# Patient Record
Sex: MALE | Race: ASIAN | NOT HISPANIC OR LATINO | URBAN - METROPOLITAN AREA
[De-identification: names, ages, dates, MRNs, and addresses within clinical notes are randomized per-mention and may not be internally consistent; named-entity substitution may affect disease eponyms.]

---

## 2019-09-08 ENCOUNTER — INPATIENT (INPATIENT)
Facility: HOSPITAL | Age: 59
LOS: 3 days | Discharge: HOME | DRG: 551 | End: 2019-09-12
Attending: NEUROLOGICAL SURGERY | Admitting: NEUROLOGICAL SURGERY
Payer: COMMERCIAL

## 2019-09-08 VITALS
HEART RATE: 81 BPM | OXYGEN SATURATION: 98 % | RESPIRATION RATE: 18 BRPM | SYSTOLIC BLOOD PRESSURE: 147 MMHG | DIASTOLIC BLOOD PRESSURE: 91 MMHG | HEIGHT: 71 IN | TEMPERATURE: 98 F | WEIGHT: 179.9 LBS

## 2019-09-08 LAB
ALBUMIN SERPL ELPH-MCNC: 4.7 G/DL — SIGNIFICANT CHANGE UP (ref 3.3–5)
ALP SERPL-CCNC: 112 U/L — SIGNIFICANT CHANGE UP (ref 40–120)
ALT FLD-CCNC: 22 U/L — SIGNIFICANT CHANGE UP (ref 10–45)
ANION GAP SERPL CALC-SCNC: 12 MMOL/L — SIGNIFICANT CHANGE UP (ref 5–17)
APPEARANCE UR: CLEAR — SIGNIFICANT CHANGE UP
APTT BLD: 32.5 SEC — SIGNIFICANT CHANGE UP (ref 27.5–36.3)
AST SERPL-CCNC: 31 U/L — SIGNIFICANT CHANGE UP (ref 10–40)
BACTERIA # UR AUTO: NEGATIVE — SIGNIFICANT CHANGE UP
BASOPHILS # BLD AUTO: 0.1 K/UL — SIGNIFICANT CHANGE UP (ref 0–0.2)
BASOPHILS NFR BLD AUTO: 0.9 % — SIGNIFICANT CHANGE UP (ref 0–2)
BILIRUB SERPL-MCNC: 0.4 MG/DL — SIGNIFICANT CHANGE UP (ref 0.2–1.2)
BILIRUB UR-MCNC: NEGATIVE — SIGNIFICANT CHANGE UP
BUN SERPL-MCNC: 13 MG/DL — SIGNIFICANT CHANGE UP (ref 7–23)
CALCIUM SERPL-MCNC: 10.1 MG/DL — SIGNIFICANT CHANGE UP (ref 8.4–10.5)
CHLORIDE SERPL-SCNC: 103 MMOL/L — SIGNIFICANT CHANGE UP (ref 96–108)
CO2 SERPL-SCNC: 25 MMOL/L — SIGNIFICANT CHANGE UP (ref 22–31)
COLOR SPEC: COLORLESS — SIGNIFICANT CHANGE UP
CREAT SERPL-MCNC: 1.17 MG/DL — SIGNIFICANT CHANGE UP (ref 0.5–1.3)
DIFF PNL FLD: ABNORMAL
EOSINOPHIL # BLD AUTO: 0.2 K/UL — SIGNIFICANT CHANGE UP (ref 0–0.5)
EOSINOPHIL NFR BLD AUTO: 2.9 % — SIGNIFICANT CHANGE UP (ref 0–6)
EPI CELLS # UR: 0 /HPF — SIGNIFICANT CHANGE UP
GLUCOSE SERPL-MCNC: 108 MG/DL — HIGH (ref 70–99)
GLUCOSE UR QL: NEGATIVE — SIGNIFICANT CHANGE UP
HCT VFR BLD CALC: 44.2 % — SIGNIFICANT CHANGE UP (ref 39–50)
HGB BLD-MCNC: 15.1 G/DL — SIGNIFICANT CHANGE UP (ref 13–17)
HYALINE CASTS # UR AUTO: 1 /LPF — SIGNIFICANT CHANGE UP (ref 0–2)
INR BLD: 1.02 RATIO — SIGNIFICANT CHANGE UP (ref 0.88–1.16)
KETONES UR-MCNC: NEGATIVE — SIGNIFICANT CHANGE UP
LEUKOCYTE ESTERASE UR-ACNC: NEGATIVE — SIGNIFICANT CHANGE UP
LYMPHOCYTES # BLD AUTO: 1.9 K/UL — SIGNIFICANT CHANGE UP (ref 1–3.3)
LYMPHOCYTES # BLD AUTO: 24.9 % — SIGNIFICANT CHANGE UP (ref 13–44)
MCHC RBC-ENTMCNC: 28.5 PG — SIGNIFICANT CHANGE UP (ref 27–34)
MCHC RBC-ENTMCNC: 34.2 GM/DL — SIGNIFICANT CHANGE UP (ref 32–36)
MCV RBC AUTO: 83.2 FL — SIGNIFICANT CHANGE UP (ref 80–100)
MONOCYTES # BLD AUTO: 0.6 K/UL — SIGNIFICANT CHANGE UP (ref 0–0.9)
MONOCYTES NFR BLD AUTO: 7.8 % — SIGNIFICANT CHANGE UP (ref 2–14)
NEUTROPHILS # BLD AUTO: 4.8 K/UL — SIGNIFICANT CHANGE UP (ref 1.8–7.4)
NEUTROPHILS NFR BLD AUTO: 63.5 % — SIGNIFICANT CHANGE UP (ref 43–77)
NITRITE UR-MCNC: NEGATIVE — SIGNIFICANT CHANGE UP
PH UR: 6.5 — SIGNIFICANT CHANGE UP (ref 5–8)
PLATELET # BLD AUTO: 160 K/UL — SIGNIFICANT CHANGE UP (ref 150–400)
POTASSIUM SERPL-MCNC: 4.1 MMOL/L — SIGNIFICANT CHANGE UP (ref 3.5–5.3)
POTASSIUM SERPL-SCNC: 4.1 MMOL/L — SIGNIFICANT CHANGE UP (ref 3.5–5.3)
PROT SERPL-MCNC: 7.6 G/DL — SIGNIFICANT CHANGE UP (ref 6–8.3)
PROT UR-MCNC: NEGATIVE — SIGNIFICANT CHANGE UP
PROTHROM AB SERPL-ACNC: 11.7 SEC — SIGNIFICANT CHANGE UP (ref 10–12.9)
RBC # BLD: 5.31 M/UL — SIGNIFICANT CHANGE UP (ref 4.2–5.8)
RBC # FLD: 13.7 % — SIGNIFICANT CHANGE UP (ref 10.3–14.5)
RBC CASTS # UR COMP ASSIST: 14 /HPF — HIGH (ref 0–4)
SODIUM SERPL-SCNC: 140 MMOL/L — SIGNIFICANT CHANGE UP (ref 135–145)
SP GR SPEC: 1.01 — SIGNIFICANT CHANGE UP (ref 1.01–1.02)
UROBILINOGEN FLD QL: NEGATIVE — SIGNIFICANT CHANGE UP
WBC # BLD: 7.6 K/UL — SIGNIFICANT CHANGE UP (ref 3.8–10.5)
WBC # FLD AUTO: 7.6 K/UL — SIGNIFICANT CHANGE UP (ref 3.8–10.5)
WBC UR QL: 4 /HPF — SIGNIFICANT CHANGE UP (ref 0–5)

## 2019-09-08 PROCEDURE — 72125 CT NECK SPINE W/O DYE: CPT | Mod: 26

## 2019-09-08 PROCEDURE — 99285 EMERGENCY DEPT VISIT HI MDM: CPT

## 2019-09-08 PROCEDURE — 72148 MRI LUMBAR SPINE W/O DYE: CPT | Mod: 26

## 2019-09-08 PROCEDURE — 72141 MRI NECK SPINE W/O DYE: CPT | Mod: 26

## 2019-09-08 PROCEDURE — 72146 MRI CHEST SPINE W/O DYE: CPT | Mod: 26

## 2019-09-08 PROCEDURE — 72128 CT CHEST SPINE W/O DYE: CPT | Mod: 26

## 2019-09-08 RX ORDER — DIAZEPAM 5 MG
5 TABLET ORAL ONCE
Refills: 0 | Status: DISCONTINUED | OUTPATIENT
Start: 2019-09-08 | End: 2019-09-08

## 2019-09-08 RX ORDER — MORPHINE SULFATE 50 MG/1
4 CAPSULE, EXTENDED RELEASE ORAL ONCE
Refills: 0 | Status: DISCONTINUED | OUTPATIENT
Start: 2019-09-08 | End: 2019-09-08

## 2019-09-08 RX ORDER — ACETAMINOPHEN 500 MG
975 TABLET ORAL ONCE
Refills: 0 | Status: COMPLETED | OUTPATIENT
Start: 2019-09-08 | End: 2019-09-08

## 2019-09-08 RX ADMIN — Medication 975 MILLIGRAM(S): at 20:58

## 2019-09-08 RX ADMIN — MORPHINE SULFATE 4 MILLIGRAM(S): 50 CAPSULE, EXTENDED RELEASE ORAL at 20:06

## 2019-09-08 RX ADMIN — Medication 975 MILLIGRAM(S): at 20:06

## 2019-09-08 RX ADMIN — MORPHINE SULFATE 4 MILLIGRAM(S): 50 CAPSULE, EXTENDED RELEASE ORAL at 20:58

## 2019-09-08 RX ADMIN — Medication 5 MILLIGRAM(S): at 20:06

## 2019-09-08 NOTE — ED PROVIDER NOTE - CLINICAL SUMMARY MEDICAL DECISION MAKING FREE TEXT BOX
60yo male pt with PMHx, of HTN, HLD, ambulatory c/o neck/ upper back pain and B/L arm burning tingling pain/ weakness s/p fall one hour ago. Pt stated he slipped and fell hitting upper back on the bathtub. Denies LOC or head injury. Denies headache, dizziness or visual changes. Denies N/V. Denies CP/SOB/ABD pain. Denies pelvic or hip pain. Denies fever, chills or recent sickness. NAD, VSS, Afebrile, + PERRL with full EOMs, No facial or scalp tender. C6,7 and T1,2,3 tender without lesions. General B/L UE tender to palpation with intact sensory. Diminished grab strength of b/l hand secondary to pain. Warm and Dry skin with cap refill <2sec. Lungs clear. ABD soft, non tender. No RIB or CVA tender. No pelvic or hip tender. Normal rectal tone. Normal B/L LE exam. cocern for central cord syndrome v/s vertebral fracture cord compression CT scan cx and thoracic spines possible MRI Neurosurgery spine eval --Quiroga

## 2019-09-08 NOTE — ED ADULT TRIAGE NOTE - CHIEF COMPLAINT QUOTE
slipped and fell in bathroom on to back; denies headstrike; ambulatory; numbness/tingling bilateral arm; pain upper back; denies anticoagulation

## 2019-09-08 NOTE — ED PROVIDER NOTE - PROGRESS NOTE DETAILS
Meseret BECKWITH C- collar applied. pt's evaluated by neurosurgery and recommended MRI. Pt will transfer to CDU for frequent neuro check and MRI. pt's evaluated by neurosurgery and recommended MRI. Pt went to MRI.

## 2019-09-08 NOTE — CONSULT NOTE ADULT - ASSESSMENT
Mayte Rosado   59 M s/p fall and hit his neck, now with b/l UE tingling and numbness. CT C and T spine negative for any acute injuries. On exam: UE proximal 4+/5, HG 4-/5 L weaker than right, tingling numbness down both arms. Otherwise neurologically intact  - MRI C and T spine to rule out any cord injury or compression

## 2019-09-08 NOTE — ED PROVIDER NOTE - ATTENDING CONTRIBUTION TO CARE
I have seen and evaluated this patient with the [facility] practice clinician.   I agree with the findings  unless other wise stated. I have amended notes where needed.  After my face to face bedside evaluation, I am notinyo male pt with PMHx, of HTN, HLD, ambulatory c/o neck/ upper back pain and B/L arm burning tingling pain/ weakness s/p fall one hour ago. Pt stated he slipped and fell hitting upper back on the bathtub. Denies LOC or head injury. Denies headache, dizziness or visual changes. Denies N/V. Denies CP/SOB/ABD pain. Denies pelvic or hip pain. Denies fever, chills or recent sickness. NAD, VSS, Afebrile, + PERRL with full EOMs, No facial or scalp tender. C6,7 and T1,2,3 tender without lesions. General B/L UE tender to palpation with intact sensory. Diminished grab strength of b/l hand secondary to pain. Warm and Dry skin with cap refill <2sec. Lungs clear. ABD soft, non tender. No RIB or CVA tender. No pelvic or hip tender. Normal rectal tone. Normal B/L LE exam. cocern for central cord syndrome v/s vertebral fracture cord compression CT scan cx and thoracic spines possible MRI Neurosurgery spine eval done admit to neuro surgery for cord compression --Quiroga

## 2019-09-08 NOTE — ED ADULT NURSE NOTE - FINAL NURSING ELECTRONIC SIGNATURE
Problem: Patient Care Overview  Goal: Plan of Care Review  Outcome: Ongoing (interventions implemented as appropriate)  Pt tolerated Imfinzi well.  No s/s of reaction. Vitals stable. NAD.         09-Sep-2019 01:45

## 2019-09-08 NOTE — ED PROVIDER NOTE - OBJECTIVE STATEMENT
60yo male pt with PMHx, of HTN, HLD, ambulatory c/o neck/ upper back pain and B/L arm tingling pain/ weakness s/p fall one hour ago. Pt stated he slipped and fell hitting upper back on the bathtub. Denies LOC or head injury. Denies headache, dizziness or visual changes. Denies N/V. Denies CP/SOB/ABD pain. Denies pelvic or hip pain. Denies fever, chills or recent sickness. 58yo male pt with PMHx, of HTN, HLD, ambulatory c/o neck/ upper back pain and B/L arm burning tingling pain/ weakness s/p fall one hour ago. Pt stated he slipped and fell hitting upper back on the bathtub. Denies LOC or head injury. Denies headache, dizziness or visual changes. Denies N/V. Denies CP/SOB/ABD pain. Denies pelvic or hip pain. Denies fever, chills or recent sickness.

## 2019-09-08 NOTE — ED PROVIDER NOTE - PHYSICAL EXAMINATION
NAD, VSS, Afebrile, + PERRL with full EOMs, No facial or scalp tender. C6,7 and T1,2,3 tender without lesions. General B/L UE tender to palpation with intact sensory. Diminished grab strength of b/l hand secondary to pain. Warm and Dry skin with cap refill <2sec. Lungs clear. ABD soft, non tender. No RIB or CVA tender. No pelvic or hip tender. Normal rectal tone. Normal B/L LE exam.

## 2019-09-08 NOTE — CONSULT NOTE ADULT - SUBJECTIVE AND OBJECTIVE BOX
p ([id number])     HPI:  58yo male pt with PMHx, of HTN, HLD, ambulatory c/o neck/ upper back pain and B/L arm burning tingling pain/ weakness s/p fall one hour ago. Pt stated he slipped and fell hitting upper back on the bathtub. Denies LOC or head injury. Denies headache, dizziness or visual changes. Denies N/V. Denies CP/SOB/ABD pain. Denies pelvic or hip pain. Denies fever, chills or recent sickness.      Imaging: MRI pending. CT C&T spine pending    Exam:  Exam:  AOx3, Following Commands  Motor:          Upper extremity                       Delt      Bicep     Tricep     HG                                                 L         4+/5        5/5          5/5   4-/5                                               R          4+/5       5/5          5/5   4-/5          Lower extremity                           HF          KF         KE         DF        PF                                                  L           5/5         5/5        5/5       5/5        5/5                                               R           5/5         5/5        5/5       5/5        5/5  Sensation / Reflexes  [x] intact to light touch   No clonus    --Anticoagulation:    =====================  PAST MEDICAL HISTORY     PAST SURGICAL HISTORY         MEDICATIONS:  Antibiotics:    Neuro:    Other:      SOCIAL HISTORY:   Occupation:   Marital Status:     FAMILY HISTORY:      ROS: Negative except per HPI    LABS:  PT/INR - ( 08 Sep 2019 20:18 )   PT: 11.7 sec;   INR: 1.02 ratio         PTT - ( 08 Sep 2019 20:18 )  PTT:32.5 sec                        15.1   7.6   )-----------( 160      ( 08 Sep 2019 20:18 )             44.2     09-08    140  |  103  |  13  ----------------------------<  108<H>  4.1   |  25  |  1.17    Ca    10.1      08 Sep 2019 20:18    TPro  7.6  /  Alb  4.7  /  TBili  0.4  /  DBili  x   /  AST  31  /  ALT  22  /  AlkPhos  112  09-08

## 2019-09-08 NOTE — ED ADULT NURSE NOTE - OBJECTIVE STATEMENT
pt states, "I was getting out of the shower when I slipped and fell and landed on my back. it happened about and hour ago and I am having tingling in my arms." pt denies hitting his head, LOC, blood thinner use, chest pain, SOB, lightheadedness, dizziness at present. pt ambulating with steady gait.

## 2019-09-09 DIAGNOSIS — M54.12 RADICULOPATHY, CERVICAL REGION: ICD-10-CM

## 2019-09-09 LAB
BLD GP AB SCN SERPL QL: NEGATIVE — SIGNIFICANT CHANGE UP
RH IG SCN BLD-IMP: POSITIVE — SIGNIFICANT CHANGE UP
RH IG SCN BLD-IMP: POSITIVE — SIGNIFICANT CHANGE UP

## 2019-09-09 PROCEDURE — 99232 SBSQ HOSP IP/OBS MODERATE 35: CPT

## 2019-09-09 PROCEDURE — 99222 1ST HOSP IP/OBS MODERATE 55: CPT

## 2019-09-09 PROCEDURE — 99223 1ST HOSP IP/OBS HIGH 75: CPT

## 2019-09-09 PROCEDURE — 93010 ELECTROCARDIOGRAM REPORT: CPT

## 2019-09-09 PROCEDURE — 71045 X-RAY EXAM CHEST 1 VIEW: CPT | Mod: 26

## 2019-09-09 RX ORDER — DEXTROSE MONOHYDRATE, SODIUM CHLORIDE, AND POTASSIUM CHLORIDE 50; .745; 4.5 G/1000ML; G/1000ML; G/1000ML
1000 INJECTION, SOLUTION INTRAVENOUS
Refills: 0 | Status: DISCONTINUED | OUTPATIENT
Start: 2019-09-09 | End: 2019-09-09

## 2019-09-09 RX ORDER — DOCUSATE SODIUM 100 MG
100 CAPSULE ORAL THREE TIMES A DAY
Refills: 0 | Status: DISCONTINUED | OUTPATIENT
Start: 2019-09-09 | End: 2019-09-12

## 2019-09-09 RX ORDER — FOLIC ACID 0.8 MG
1 TABLET ORAL DAILY
Refills: 0 | Status: DISCONTINUED | OUTPATIENT
Start: 2019-09-09 | End: 2019-09-09

## 2019-09-09 RX ORDER — LOSARTAN POTASSIUM 100 MG/1
25 TABLET, FILM COATED ORAL DAILY
Refills: 0 | Status: DISCONTINUED | OUTPATIENT
Start: 2019-09-09 | End: 2019-09-12

## 2019-09-09 RX ORDER — ACETAMINOPHEN 500 MG
650 TABLET ORAL EVERY 6 HOURS
Refills: 0 | Status: DISCONTINUED | OUTPATIENT
Start: 2019-09-09 | End: 2019-09-12

## 2019-09-09 RX ORDER — SENNA PLUS 8.6 MG/1
2 TABLET ORAL AT BEDTIME
Refills: 0 | Status: DISCONTINUED | OUTPATIENT
Start: 2019-09-09 | End: 2019-09-12

## 2019-09-09 RX ORDER — INFLUENZA VIRUS VACCINE 15; 15; 15; 15 UG/.5ML; UG/.5ML; UG/.5ML; UG/.5ML
0.5 SUSPENSION INTRAMUSCULAR ONCE
Refills: 0 | Status: DISCONTINUED | OUTPATIENT
Start: 2019-09-09 | End: 2019-09-12

## 2019-09-09 RX ORDER — OXYCODONE HYDROCHLORIDE 5 MG/1
10 TABLET ORAL EVERY 4 HOURS
Refills: 0 | Status: DISCONTINUED | OUTPATIENT
Start: 2019-09-09 | End: 2019-09-12

## 2019-09-09 RX ORDER — DEXAMETHASONE 0.5 MG/5ML
4 ELIXIR ORAL THREE TIMES A DAY
Refills: 0 | Status: DISCONTINUED | OUTPATIENT
Start: 2019-09-09 | End: 2019-09-12

## 2019-09-09 RX ORDER — ATORVASTATIN CALCIUM 80 MG/1
20 TABLET, FILM COATED ORAL AT BEDTIME
Refills: 0 | Status: DISCONTINUED | OUTPATIENT
Start: 2019-09-09 | End: 2019-09-12

## 2019-09-09 RX ORDER — ENOXAPARIN SODIUM 100 MG/ML
40 INJECTION SUBCUTANEOUS AT BEDTIME
Refills: 0 | Status: DISCONTINUED | OUTPATIENT
Start: 2019-09-09 | End: 2019-09-12

## 2019-09-09 RX ORDER — OXYCODONE HYDROCHLORIDE 5 MG/1
5 TABLET ORAL EVERY 4 HOURS
Refills: 0 | Status: DISCONTINUED | OUTPATIENT
Start: 2019-09-09 | End: 2019-09-12

## 2019-09-09 RX ORDER — GABAPENTIN 400 MG/1
300 CAPSULE ORAL THREE TIMES A DAY
Refills: 0 | Status: DISCONTINUED | OUTPATIENT
Start: 2019-09-09 | End: 2019-09-12

## 2019-09-09 RX ORDER — THIAMINE MONONITRATE (VIT B1) 100 MG
100 TABLET ORAL DAILY
Refills: 0 | Status: DISCONTINUED | OUTPATIENT
Start: 2019-09-09 | End: 2019-09-09

## 2019-09-09 RX ADMIN — Medication 650 MILLIGRAM(S): at 06:03

## 2019-09-09 RX ADMIN — Medication 1 MILLIGRAM(S): at 13:25

## 2019-09-09 RX ADMIN — Medication 100 MILLIGRAM(S): at 21:23

## 2019-09-09 RX ADMIN — Medication 650 MILLIGRAM(S): at 06:33

## 2019-09-09 RX ADMIN — Medication 4 MILLIGRAM(S): at 21:23

## 2019-09-09 RX ADMIN — OXYCODONE HYDROCHLORIDE 10 MILLIGRAM(S): 5 TABLET ORAL at 13:44

## 2019-09-09 RX ADMIN — ATORVASTATIN CALCIUM 20 MILLIGRAM(S): 80 TABLET, FILM COATED ORAL at 21:23

## 2019-09-09 RX ADMIN — Medication 1 TABLET(S): at 13:25

## 2019-09-09 RX ADMIN — Medication 100 MILLIGRAM(S): at 06:03

## 2019-09-09 RX ADMIN — Medication 650 MILLIGRAM(S): at 13:25

## 2019-09-09 RX ADMIN — LOSARTAN POTASSIUM 25 MILLIGRAM(S): 100 TABLET, FILM COATED ORAL at 17:58

## 2019-09-09 RX ADMIN — Medication 100 MILLIGRAM(S): at 13:25

## 2019-09-09 RX ADMIN — OXYCODONE HYDROCHLORIDE 10 MILLIGRAM(S): 5 TABLET ORAL at 21:23

## 2019-09-09 RX ADMIN — Medication 650 MILLIGRAM(S): at 13:55

## 2019-09-09 RX ADMIN — OXYCODONE HYDROCHLORIDE 10 MILLIGRAM(S): 5 TABLET ORAL at 21:53

## 2019-09-09 RX ADMIN — GABAPENTIN 300 MILLIGRAM(S): 400 CAPSULE ORAL at 21:23

## 2019-09-09 RX ADMIN — SENNA PLUS 2 TABLET(S): 8.6 TABLET ORAL at 21:22

## 2019-09-09 RX ADMIN — OXYCODONE HYDROCHLORIDE 10 MILLIGRAM(S): 5 TABLET ORAL at 13:55

## 2019-09-09 RX ADMIN — ENOXAPARIN SODIUM 40 MILLIGRAM(S): 100 INJECTION SUBCUTANEOUS at 21:22

## 2019-09-09 NOTE — PROGRESS NOTE ADULT - ASSESSMENT
58yo male pt with PMHx, of HTN, HLD, ambulatory c/o neck/ upper back pain and B/L arm burning tingling pain/ weakness s/p fall one hour ago. Pt stated he slipped and fell hitting upper back on the bathtub. Denies LOC or head injury. Denies headache, dizziness or visual changes. Denies N/V. Denies CP/SOB/ABD pain. Denies pelvic or hip pain. Denies fever, chills or recent sickness. (09 Sep 2019 05:04)    PROCEDURE:  Adm 9/9 S/P fall in BR.  MRI revealed C-spine stenosis  POD#NA    PLAN:  Neuro: Cachil DeHe-J on at all times. Surgical plans to be d/w Dr Winslow. EKG-P FU.  Inc activity/OOB.     Med/Dr DANIELLE Diop [phone] called this AM to se pt FU.    Respiratory: Patient instructed to use incentive spirometer [ ] YES [X ] NO              DVT ppx: [X ] SQL [ ] SQH and Venodynes [ ] Left [ ] Right [ X] Bilateral    Discharge Planning: PT eval Postop FU    Assessment:  Please Check When Present   []  GCS  E   V  M     Heart Failure: []Acute, [] acute on chronic , []chronic  Heart Failure:  [] Diastolic (HFpEF), [] Systolic (HFrEF), []Combined (HFpEF and HFrEF), [] RHF, [] Pulm HTN, [] Other    [] ELEONORA, [] ATN, [] AIN, [] other  [] CKD1, [] CKD2, [] CKD 3, [] CKD 4, [] CKD 5, []ESRD    Encephalopathy: [] Metabolic, [] Hepatic, [] toxic, [] Neurological, [] Other    Abnormal Nurtitional Status: [] malnurtition (see nutrition note), [ ]underweight: BMI < 19, [] morbid obesity: BMI >40, [] Cachexia    [] Sepsis  [] hypovolemic shock,[] cardiogenic shock, [] hemorrhagic shock, [] neuogenic shock  [] Acute Respiratory Failure  []Cerebral edema, [] Brain compression/ herniation,   [] Functional quadriplegia  [] Acute blood loss anemia

## 2019-09-09 NOTE — CONSULT NOTE ADULT - SUBJECTIVE AND OBJECTIVE BOX
Cc: Patient is a 59y old  Male who presents with a chief complaint of s/p fall with neck pain, w b/l numbness and distal weakness (09 Sep 2019 12:03)    Admission HPI:  58yo male pt with PMHx, of HTN, HLD, ambulatory c/o neck/ upper back pain and B/L arm burning tingling pain/ weakness s/p fall one hour ago. Pt stated he slipped and fell hitting upper back on the bathtub. Denies LOC or head injury. Denies headache, dizziness or visual changes. Denies N/V. Denies CP/SOB/ABD pain. Denies pelvic or hip  end of copied text pain. Denies fever, chills or recent sickness. (09 Sep 2019 05:04)    Interval History:  Patient with neck pain. Also with LUE weakness at the hand.   No leg weakness. No B/B incontinence.   Has cervical collar.   MRI of cervical spine : No acute fractures or dislocations. Disc osteophyte complex   C3-4 with mild cord compression and possible cord signal change. Small   right-sided disc herniation T8-9 without cord compression. L5-S1 disc   herniation and degenerative disc disease with moderate thecal sac   compression.    REVIEW OF SYSTEMS: Neck and LUE discomfort, L hand weakness, no B/B incontinence, No chest pain, shortness of breath, nausea, vomiting or diarhea; other ROS neg     PAST MEDICAL & SURGICAL HISTORY  Dyslipidemia  Essential hypertension    FUNCTIONAL HISTORY:   Lives w family in apt w elevator access.  PTA Independent; works as an     FAMILY HISTORY   N/C    RECENT LABS/IMAGING  CBC Full  -  ( 08 Sep 2019 20:18 )  WBC Count : 7.6 K/uL  RBC Count : 5.31 M/uL  Hemoglobin : 15.1 g/dL  Hematocrit : 44.2 %  Platelet Count - Automated : 160 K/uL  Mean Cell Volume : 83.2 fl  Mean Cell Hemoglobin : 28.5 pg  Mean Cell Hemoglobin Concentration : 34.2 gm/dL  Auto Neutrophil # : 4.8 K/uL  Auto Lymphocyte # : 1.9 K/uL  Auto Monocyte # : 0.6 K/uL  Auto Eosinophil # : 0.2 K/uL  Auto Basophil # : 0.1 K/uL  Auto Neutrophil % : 63.5 %  Auto Lymphocyte % : 24.9 %  Auto Monocyte % : 7.8 %  Auto Eosinophil % : 2.9 %  Auto Basophil % : 0.9 %        140  |  103  |  13  ----------------------------<  108<H>  4.1   |  25  |  1.17    Ca    10.1      08 Sep 2019 20:18    TPro  7.6  /  Alb  4.7  /  TBili  0.4  /  DBili  x   /  AST  31  /  ALT  22  /  AlkPhos  112      Urinalysis Basic - ( 08 Sep 2019 20:35 )    Color: Colorless / Appearance: Clear / S.010 / pH: x  Gluc: x / Ketone: Negative  / Bili: Negative / Urobili: Negative   Blood: x / Protein: Negative / Nitrite: Negative   Leuk Esterase: Negative / RBC: 14 /hpf / WBC 4 /HPF   Sq Epi: x / Non Sq Epi: 0 /hpf / Bacteria: Negative        VITALS  T(C): 36.8 (19 @ 02:00), Max: 37 (19 @ 23:57)  HR: 62 (19 @ 02:00) (62 - 85)  BP: 150/88 (19 @ 02:00) (140/87 - 150/88)  RR: 16 (19 @ 02:00) (16 - 18)  SpO2: 98% (19 @ 02:00) (98% - 99%)  Wt(kg): --    ALLERGIES  No Known Allergies      MEDICATIONS   acetaminophen   Tablet .. 650 milliGRAM(s) Oral every 6 hours PRN  atorvastatin 20 milliGRAM(s) Oral at bedtime  bisacodyl 5 milliGRAM(s) Oral daily PRN  docusate sodium 100 milliGRAM(s) Oral three times a day  enoxaparin Injectable 40 milliGRAM(s) SubCutaneous at bedtime  folic acid 1 milliGRAM(s) Oral daily  influenza   Vaccine 0.5 milliLiter(s) IntraMuscular once  multivitamin 1 Tablet(s) Oral daily  senna 2 Tablet(s) Oral at bedtime PRN  thiamine 100 milliGRAM(s) Oral daily      ----------------------------------------------------------------------------------------  PHYSICAL EXAM  Constitutional - NAD, Comfortable  HEENT - NCAT, EOMI  Neck - Cervical collar in place  Chest - CTA bilaterally, No wheeze, No rhonchi, No crackles  Cardiovascular - RRR, S1S2, No murmurs  Abdomen - BS+, Soft, NTND  Extremities - No C/C/E, No calf tenderness   Neurologic Exam -                   AAO x 3    Motor- 5/5 at all dermatomes of bl LEs and RUE, 5/5 at  L EF/WE/EE, 4/5 L F abd and FFs   Psychiatric - Mood stable, Affect WNL    Impression:  58 yo with functional deficits secondary to diagnosis of spinal stenosis    Plan:  PT- ROM, Bed Mob, Transfers, Amb w AD   OT- ADLs, bracing  Prec- Falls, Cardiac, cervical collar  DVT Prophylaxis- lovenox  Skin- Turn q2 h  Pain- May need gabapentin for LUE pain  Dispo- When stable and sx w/u and plan complete will need outpt rehab.

## 2019-09-09 NOTE — CHART NOTE - NSCHARTNOTEFT_GEN_A_CORE
CAPRINI SCORE [CLOT] Score on Admission for     AGE RELATED RISK FACTORS                                                       MOBILITY RELATED FACTORS  [x ] Age 41-60 years                                            (1 Point)                  [ ] Bed rest                                                        (1 Point)  [ ] Age: 61-74 years                                           (2 Points)                 [ ] Plaster cast                                                   (2 Points)  [ ] Age= 75 years                                              (3 Points)                 [ ] Bed bound for more than 72 hours                 (2 Points)    DISEASE RELATED RISK FACTORS                                               GENDER SPECIFIC FACTORS  [ ] Edema in the lower extremities                       (1 Point)                  [ ] Pregnancy                                                     (1 Point)  [ ] Varicose veins                                               (1 Point)                  [ ] Post-partum < 6 weeks                                   (1 Point)             [ x] BMI > 25 Kg/m2                                            (1 Point)                  [ ] Hormonal therapy  or oral contraception          (1 Point)                 [ ] Sepsis (in the previous month)                        (1 Point)                  [ ] History of pregnancy complications                 (1 point)  [ ] Pneumonia or serious lung disease                                               [ ] Unexplained or recurrent                     (1 Point)           (in the previous month)                               (1 Point)  [ ] Abnormal pulmonary function test                     (1 Point)                 SURGERY RELATED RISK FACTORS (include planned surgeries)  [ ] Acute myocardial infarction                              (1 Point)                 [ ]  Section                                             (1 Point)  [ ] Congestive heart failure (in the previous month)  (1 Point)               [ ] Minor surgery                                                  (1 Point)   [ ] Inflammatory bowel disease                             (1 Point)                 [ ] Arthroscopic surgery                                        (2 Points)  [ ] Central venous access                                      (2 Points)                [ ] General surgery lasting more than 45 minutes   (2 Points)       [ ] Stroke (in the previous month)                          (5 Points)               [ ] Elective arthroplasty                                         (5 Points)            [ ] Current or past malignancy                                (2 Points)                                                                                                     HEMATOLOGY RELATED FACTORS                                                 TRAUMA RELATED RISK FACTORS  [ ] Prior episodes of VTE                                     (3 Points)                [ ] Fracture of the hip, pelvis, or leg                       (5 Points)  [ ] Positive family history for VTE                         (3 Points)                 [ ] Acute spinal cord injury (in the previous month)  (5 Points)  [ ] Prothrombin 98774 A                                     (3 Points)                 [ ] Paralysis  (less than 1 month)                             (5 Points)  [ ] Factor V Leiden                                             (3 Points)                  [ ] Multiple Trauma within 1 month                        (5 Points)  [ ] Lupus anticoagulants                                     (3 Points)                                                           [ ] Anticardiolipin antibodies                               (3 Points)                                                       [ ] High homocysteine in the blood                      (3 Points)                                             [ ] Other congenital or acquired thrombophilia      (3 Points)                                                [ ] Heparin induced thrombocytopenia                  (3 Points)                                          Total Score [    2      ]    Risk:  Very low 0   Low 1 to 2   Moderate 3 to 4   High =5       VTE Prophylasix Recommednations:  [x ] mechanical pneumatic compression devices                                      [ ] contraindicated: _____________________  [ x] chemo prophylasix                                                                                   [ ] contraindicated _____________________    **** HIGH LIKELIHOOD DVT PRESENT ON ADMISSION  [ ] (please order LE dopplers within 24 hours of admission)

## 2019-09-09 NOTE — ED ADULT NURSE REASSESSMENT NOTE - NS ED NURSE REASSESS COMMENT FT1
Pt is breathing unlabored on RA. As per NP Monica, pt can eat/drink. Pt provided meal at this time. Educated pt on plan of care. Safety and comfort maintained. Pt admitted to neurosurgery, awaiting a bed.

## 2019-09-09 NOTE — H&P ADULT - ASSESSMENT
Mayte Rosado  59 M s/p fall and hit his neck, now with b/l UE tingling and numbness. CT C and T spine negative for any acute injuries. On exam: UE proximal 4+/5, HG 4-/5 L weaker than right, tingling numbness down both arms. Otherwise neurologically intact. Neuroexam was initially 3/5 HG weakness but improved overnight.    - Admit to Neurosurgery:    - CT shows some degenerative changes  - MRI shows moderate to sever canal stenosis with some signal change at C3-4  - MAP > 85. Currently at 108

## 2019-09-09 NOTE — H&P ADULT - ATTENDING COMMENTS
Pt seen and examined with resident.  Agree with above evaluation and assessment. PT c/o pain radiating to the ulnar regions of both hands.  He is unable to flex fingers in  on left side. Right  good.  He has good finger extension.  CT shows C34 disc/osteophyte.  MRI limited study as spinal survey shows C34 large disc/osteophye complex compressing the spinal cord. There is mild signal change.  There is spondylotic disease at C56 and C67 with canal stenosis an mild cord compression.  There is left FS C34 and C45. Discussed at length with films pt's findings recommendation for surgery.  Pt with asymmetrical, mild central cord syndrome and goal of decompression would be to prevent further injury.  Pt will need therapy for left UE weakness.  Pt has potential option, pending PT recommendations for discharge, if able to be sent home with therapy, and readmission for elective surgery with next 2 weeks.  Pt in cervical collar at this time.  Plan for formal, dedicated C-spine MRI and will then discuss further options with pt once the imaging is obtained.

## 2019-09-09 NOTE — PROGRESS NOTE ADULT - ASSESSMENT
59 M   with h/o HTN. HLD     S/p fall and hit his neck, now with b/l UE tingling and numbness.   CT C and T spine negative for any acute injuries.   L UE proximal 4+/5, HG 4-/5 L weaker than right, tingling numbness down both arms,. Otherwise neurologically intact, pe r neuro surgery  -MRI C and T spine ,,  no fx/     C3/4.  mild  cord  compression /   Right disc   herniation T8/9,  no cord compression     vitals  stable/  labs stable  ekg/  cxr, pending   pt cleared, pending   above  mentioned  tests      < from: MR Acute Spinal Cord Compression (09.08.19 @ 23:39)   IMPRESSION: No acute fractures or dislocations. Disc osteophyte complex   C3-4 with mild cord compression and possible cord signal change. Small   right-sided disc herniation T8-9 without cord compression. L5-S1 disc   herniation and degenerative disc disease with moderate thecal sac   compression.  < end of copied text > 59 M   with h/o HTN. HLD     S/p fall and hit his neck, now with b/l UE tingling and numbness.   CT C and T spine negative for any acute injuries.   L UE proximal 4+/5, HG 4-/5 L weaker than right, tingling numbness down both arms,. Otherwise neurologically intact, pe r neuro surgery  -MRI C and T spine ,,  no fx/     C3/4.  mild  cord  compression /   Right disc   herniation T8/9,  no cord compression   estela foster,  at all times    vitals  stable/  labs stable  ekg. nsr    cxr, pending   pt cleared, pending   above  mentioned  test      < from: MR Acute Spinal Cord Compression (09.08.19 @ 23:39)   IMPRESSION: No acute fractures or dislocations. Disc osteophyte complex   C3-4 with mild cord compression and possible cord signal change. Small   right-sided disc herniation T8-9 without cord compression. L5-S1 disc   herniation and degenerative disc disease with moderate thecal sac   compression.  < end of copied text > 59 M   with h/o HTN. HLD     S/p fall and hit his neck, now with b/l UE tingling and numbness.   CT C and T spine negative for any acute injuries.   L UE proximal 4+/5, HG 4-/5 L weaker than right, tingling numbness down both arms,. Otherwise neurologically intact, pe r neuro surgery  -MRI C and T spine ,,  no fx/     C3/4.  mild  cord  compression /   Right disc   herniation T8/9,  no cord compression   miamalena bellear,  at all times    distal  right arm/  hand weakness  was on exforge/  on cozaar  now    vitals  stable/  labs stable  ekg. nsr    cxr, pending  last stress test about 3  yrs  ago, normal   pt cleared, pending   above  mentioned  test      < from: MR Acute Spinal Cord Compression (09.08.19 @ 23:39)   IMPRESSION: No acute fractures or dislocations. Disc osteophyte complex   C3-4 with mild cord compression and possible cord signal change. Small   right-sided disc herniation T8-9 without cord compression. L5-S1 disc   herniation and degenerative disc disease with moderate thecal sac   compression.  < end of copied text >

## 2019-09-09 NOTE — H&P ADULT - NSHPPHYSICALEXAM_GEN_ALL_CORE
AOx3, Following Commands  Motor:          Upper extremity                       Delt      Bicep     Tricep     HG                                                 L         4+/5        5/5          5/5   4-/5                                               R          4+/5       5/5          5/5   4-/5          Lower extremity                           HF          KF         KE         DF        PF                                                  L           5/5         5/5        5/5       5/5        5/5                                               R           5/5         5/5        5/5       5/5        5/5  Sensation / Reflexes  [x] intact to light touch   No clonus

## 2019-09-09 NOTE — PROGRESS NOTE ADULT - SUBJECTIVE AND OBJECTIVE BOX
s/p  fall  REVIEW OF SYSTEMS:  GEN: no fever,    no chills  RESP: no SOB,   no cough  CVS: no chest pain,   no palpitations  GI: no abdominal pain,   no nausea,   no vomiting,   no constipation,   no diarrhea  : no dysuria,   no frequency  NEURO: no headache,   no dizziness  PSYCH: no depression,   not anxious  Derm : no rash    MEDICATIONS  (STANDING):  atorvastatin 20 milliGRAM(s) Oral at bedtime  docusate sodium 100 milliGRAM(s) Oral three times a day  enoxaparin Injectable 40 milliGRAM(s) SubCutaneous at bedtime  folic acid 1 milliGRAM(s) Oral daily  influenza   Vaccine 0.5 milliLiter(s) IntraMuscular once  multivitamin 1 Tablet(s) Oral daily  thiamine 100 milliGRAM(s) Oral daily    MEDICATIONS  (PRN):  acetaminophen   Tablet .. 650 milliGRAM(s) Oral every 6 hours PRN Mild Pain (1 - 3)  bisacodyl 5 milliGRAM(s) Oral daily PRN Constipation  oxyCODONE    IR 5 milliGRAM(s) Oral every 4 hours PRN Moderate Pain (4 - 6)  oxyCODONE    IR 10 milliGRAM(s) Oral every 4 hours PRN Severe Pain (7 - 10)  senna 2 Tablet(s) Oral at bedtime PRN Constipation      Vital Signs Last 24 Hrs  T(C): 36.8 (09 Sep 2019 16:45), Max: 37 (08 Sep 2019 23:57)  T(F): 98.3 (09 Sep 2019 16:45), Max: 98.6 (08 Sep 2019 23:57)  HR: 76 (09 Sep 2019 16:45) (62 - 85)  BP: 145/88 (09 Sep 2019 16:45) (127/78 - 150/88)  BP(mean): --  RR: 18 (09 Sep 2019 16:45) (16 - 18)  SpO2: 95% (09 Sep 2019 16:45) (95% - 99%)  CAPILLARY BLOOD GLUCOSE        I&O's Summary    08 Sep 2019 07:01  -  09 Sep 2019 07:00  --------------------------------------------------------  IN: 450 mL / OUT: 0 mL / NET: 450 mL    09 Sep 2019 07:01  -  09 Sep 2019 17:06  --------------------------------------------------------  IN: 580 mL / OUT: 0 mL / NET: 580 mL        PHYSICAL EXAM:  HEAD:  Atraumatic, Normocephalic  NECK: Supple, No   JVD  CHEST/LUNG:   no     rales,     no,    rhonchi  HEART: Regular rate and rhythm;         murmur  ABDOMEN: Soft, Nontender, ;   EXTREMITIES:     no   edema  NEUROLOGY:  alert    LABS:                        15.1   7.6   )-----------( 160      ( 08 Sep 2019 20:18 )             44.2         140  |  103  |  13  ----------------------------<  108<H>  4.1   |  25  |  1.17    Ca    10.1      08 Sep 2019 20:18    TPro  7.6  /  Alb  4.7  /  TBili  0.4  /  DBili  x   /  AST  31  /  ALT  22  /  AlkPhos  112  08    PT/INR - ( 08 Sep 2019 20:18 )   PT: 11.7 sec;   INR: 1.02 ratio         PTT - ( 08 Sep 2019 20:18 )  PTT:32.5 sec      Urinalysis Basic - ( 08 Sep 2019 20:35 )    Color: Colorless / Appearance: Clear / S.010 / pH: x  Gluc: x / Ketone: Negative  / Bili: Negative / Urobili: Negative   Blood: x / Protein: Negative / Nitrite: Negative   Leuk Esterase: Negative / RBC: 14 /hpf / WBC 4 /HPF   Sq Epi: x / Non Sq Epi: 0 /hpf / Bacteria: Negative                      Consultant(s) Notes Reviewed:      Care Discussed with Consultants/Other Providers: s/p  fall  REVIEW OF SYSTEMS:  GEN: no fever,    no chills  RESP: no SOB,   no cough  CVS: no chest pain,   no palpitations  GI: no abdominal pain,   no nausea,   no vomiting,   no constipation,   no diarrhea  : no dysuria,   no frequency  NEURO: no headache,   no dizziness  PSYCH: no depression,   not anxious  Derm : no rash    MEDICATIONS  (STANDING):  atorvastatin 20 milliGRAM(s) Oral at bedtime  docusate sodium 100 milliGRAM(s) Oral three times a day  enoxaparin Injectable 40 milliGRAM(s) SubCutaneous at bedtime  folic acid 1 milliGRAM(s) Oral daily  influenza   Vaccine 0.5 milliLiter(s) IntraMuscular once  multivitamin 1 Tablet(s) Oral daily  thiamine 100 milliGRAM(s) Oral daily    MEDICATIONS  (PRN):  acetaminophen   Tablet .. 650 milliGRAM(s) Oral every 6 hours PRN Mild Pain (1 - 3)  bisacodyl 5 milliGRAM(s) Oral daily PRN Constipation  oxyCODONE    IR 5 milliGRAM(s) Oral every 4 hours PRN Moderate Pain (4 - 6)  oxyCODONE    IR 10 milliGRAM(s) Oral every 4 hours PRN Severe Pain (7 - 10)  senna 2 Tablet(s) Oral at bedtime PRN Constipation      Vital Signs Last 24 Hrs  T(C): 36.8 (09 Sep 2019 16:45), Max: 37 (08 Sep 2019 23:57)  T(F): 98.3 (09 Sep 2019 16:45), Max: 98.6 (08 Sep 2019 23:57)  HR: 76 (09 Sep 2019 16:45) (62 - 85)  BP: 145/88 (09 Sep 2019 16:45) (127/78 - 150/88)  BP(mean): --  RR: 18 (09 Sep 2019 16:45) (16 - 18)  SpO2: 95% (09 Sep 2019 16:45) (95% - 99%)  CAPILLARY BLOOD GLUCOSE        I&O's Summary    08 Sep 2019 07:01  -  09 Sep 2019 07:00  --------------------------------------------------------  IN: 450 mL / OUT: 0 mL / NET: 450 mL    09 Sep 2019 07:01  -  09 Sep 2019 17:06  --------------------------------------------------------  IN: 580 mL / OUT: 0 mL / NET: 580 mL        PHYSICAL EXAM:  HEAD:  Atraumatic, Normocephalic  NECK: Supple, No   JVD  CHEST/LUNG:   no     rales,     no,    rhonchi  HEART: Regular rate and rhythm;         murmur  ABDOMEN: Soft, Nontender, ;   EXTREMITIES:     no   edema  NEUROLOGY:  alert  right  hand weakness/ able to raise right arm    LABS:                        15.1   7.6   )-----------( 160      ( 08 Sep 2019 20:18 )             44.2         140  |  103  |  13  ----------------------------<  108<H>  4.1   |  25  |  1.17    Ca    10.1      08 Sep 2019 20:18    TPro  7.6  /  Alb  4.7  /  TBili  0.4  /  DBili  x   /  AST  31  /  ALT  22  /  AlkPhos  112  08    PT/INR - ( 08 Sep 2019 20:18 )   PT: 11.7 sec;   INR: 1.02 ratio         PTT - ( 08 Sep 2019 20:18 )  PTT:32.5 sec      Urinalysis Basic - ( 08 Sep 2019 20:35 )    Color: Colorless / Appearance: Clear / S.010 / pH: x  Gluc: x / Ketone: Negative  / Bili: Negative / Urobili: Negative   Blood: x / Protein: Negative / Nitrite: Negative   Leuk Esterase: Negative / RBC: 14 /hpf / WBC 4 /HPF   Sq Epi: x / Non Sq Epi: 0 /hpf / Bacteria: Negative                      Consultant(s) Notes Reviewed:      Care Discussed with Consultants/Other Providers:

## 2019-09-09 NOTE — PROGRESS NOTE ADULT - SUBJECTIVE AND OBJECTIVE BOX
SUBJECTIVE: Doing well, NAD. No complaints. [place] Collar on.     OVERNIGHT EVENTS: None    Vital Signs Last 24 Hrs  T(C): 36.8 (09 Sep 2019 02:00), Max: 37 (08 Sep 2019 23:57)  T(F): 98.2 (09 Sep 2019 02:00), Max: 98.6 (08 Sep 2019 23:57)  HR: 62 (09 Sep 2019 02:00) (62 - 85)  BP: 150/88 (09 Sep 2019 02:00) (140/87 - 150/88)  BP(mean): --  RR: 16 (09 Sep 2019 02:00) (16 - 18)  SpO2: 98% (09 Sep 2019 02:00) (98% - 99%)  IVF: [ X] IVL [ ] NS+K@   DIET: [X ] Regular [ ] CCD [ ] Renal [ ] Puree [ ] Dysphagia [ ] Tube Feeds:   PCA: [ ] YES [X ] NO   SUBRAMANIAN: [ ] YES [X ] NO [X ] VOID   BM: [ ] YES [X ] NO     DRAINS: NA    PHYSICAL EXAM:    Constitutional: No Acute Distress     Neurological: AOx3, Following Commands, Moving all Extremities     Motor exam:          Upper extremity                         Delt     Bicep     Tricep    HG                                                 R         5/5        5/5        5/5       4/5                                               L          5/5        5/5        5/5       4/5          Lower extremity                        HF         KF        KE       DF         PF                                                  R        5/5        5/5        5/5       5/5         5/5                                               L         5/5        5/5       5/5       5/5          5/5                                                 Sensation: [X] intact to light touch  [] decreased:     Pulmonary: Clear to Auscultation, No rales, No rhonchi, No wheezes     Cardiovascular: S1, S2, Regular rate and rhythm     Gastrointestinal: Soft, Non-tender, Non-distended     Extremities: No calf tenderness     Incision: NA    LABS:                        15.1   7.6   )-----------( 160      ( 08 Sep 2019 20:18 )             44.2    09-08    140  |  103  |  13  ----------------------------<  108<H>  4.1   |  25  |  1.17    Ca    10.1      08 Sep 2019 20:18    TPro  7.6  /  Alb  4.7  /  TBili  0.4  /  DBili  x   /  AST  31  /  ALT  22  /  AlkPhos  112  09-08  PT/INR - ( 08 Sep 2019 20:18 )   PT: 11.7 sec;   INR: 1.02 ratio    PTT - ( 08 Sep 2019 20:18 )  PTT:32.5 sec    IMAGING:  < from: MR Acute Spinal Cord Compression (09.08.19 @ 23:39) >  IMPRESSION: No acute fractures or dislocations. Disc osteophyte complex   C3-4 with mild cord compression and possible cord signal change. Small   right-sided disc herniation T8-9 without cord compression. L5-S1 disc   herniation and degenerative disc disease with moderate thecal sac   compression.    < from: CT Thoracic Spine No Cont (09.08.19 @ 20:33) >  1. No acute displaced fracture.  2. Degenerative changes within the cervical spine with significant   multilevel stenosis. Nonurgent MRI is recommended for further   characterization.    MEDICATIONS  (STANDING):  atorvastatin 20 milliGRAM(s) Oral at bedtime  docusate sodium 100 milliGRAM(s) Oral three times a day  enoxaparin Injectable 40 milliGRAM(s) SubCutaneous at bedtime  folic acid 1 milliGRAM(s) Oral daily  influenza   Vaccine 0.5 milliLiter(s) IntraMuscular once  multivitamin 1 Tablet(s) Oral daily  thiamine 100 milliGRAM(s) Oral daily    MEDICATIONS  (PRN):  acetaminophen   Tablet .. 650 milliGRAM(s) Oral every 6 hours PRN Mild Pain (1 - 3)  bisacodyl 5 milliGRAM(s) Oral daily PRN Constipation  senna 2 Tablet(s) Oral at bedtime PRN Constipation

## 2019-09-09 NOTE — H&P ADULT - HISTORY OF PRESENT ILLNESS
60yo male pt with PMHx, of HTN, HLD, ambulatory c/o neck/ upper back pain and B/L arm burning tingling pain/ weakness s/p fall one hour ago. Pt stated he slipped and fell hitting upper back on the bathtub. Denies LOC or head injury. Denies headache, dizziness or visual changes. Denies N/V. Denies CP/SOB/ABD pain. Denies pelvic or hip pain. Denies fever, chills or recent sickness.

## 2019-09-10 DIAGNOSIS — M54.12 RADICULOPATHY, CERVICAL REGION: ICD-10-CM

## 2019-09-10 LAB
GLUCOSE BLDC GLUCOMTR-MCNC: 128 MG/DL — HIGH (ref 70–99)
GLUCOSE BLDC GLUCOMTR-MCNC: 156 MG/DL — HIGH (ref 70–99)
GLUCOSE BLDC GLUCOMTR-MCNC: 199 MG/DL — HIGH (ref 70–99)
HCV AB S/CO SERPL IA: 0.15 S/CO — SIGNIFICANT CHANGE UP (ref 0–0.99)
HCV AB SERPL-IMP: SIGNIFICANT CHANGE UP

## 2019-09-10 PROCEDURE — 99233 SBSQ HOSP IP/OBS HIGH 50: CPT

## 2019-09-10 PROCEDURE — 72141 MRI NECK SPINE W/O DYE: CPT | Mod: 26

## 2019-09-10 PROCEDURE — 99232 SBSQ HOSP IP/OBS MODERATE 35: CPT

## 2019-09-10 PROCEDURE — 99231 SBSQ HOSP IP/OBS SF/LOW 25: CPT

## 2019-09-10 RX ORDER — INSULIN LISPRO 100/ML
VIAL (ML) SUBCUTANEOUS
Refills: 0 | Status: DISCONTINUED | OUTPATIENT
Start: 2019-09-10 | End: 2019-09-12

## 2019-09-10 RX ADMIN — OXYCODONE HYDROCHLORIDE 10 MILLIGRAM(S): 5 TABLET ORAL at 11:09

## 2019-09-10 RX ADMIN — OXYCODONE HYDROCHLORIDE 10 MILLIGRAM(S): 5 TABLET ORAL at 05:53

## 2019-09-10 RX ADMIN — Medication 1: at 19:03

## 2019-09-10 RX ADMIN — ATORVASTATIN CALCIUM 20 MILLIGRAM(S): 80 TABLET, FILM COATED ORAL at 21:37

## 2019-09-10 RX ADMIN — GABAPENTIN 300 MILLIGRAM(S): 400 CAPSULE ORAL at 05:23

## 2019-09-10 RX ADMIN — LOSARTAN POTASSIUM 25 MILLIGRAM(S): 100 TABLET, FILM COATED ORAL at 05:23

## 2019-09-10 RX ADMIN — Medication 4 MILLIGRAM(S): at 15:16

## 2019-09-10 RX ADMIN — GABAPENTIN 300 MILLIGRAM(S): 400 CAPSULE ORAL at 21:37

## 2019-09-10 RX ADMIN — Medication 1: at 21:38

## 2019-09-10 RX ADMIN — Medication 100 MILLIGRAM(S): at 05:23

## 2019-09-10 RX ADMIN — GABAPENTIN 300 MILLIGRAM(S): 400 CAPSULE ORAL at 15:16

## 2019-09-10 RX ADMIN — Medication 100 MILLIGRAM(S): at 21:38

## 2019-09-10 RX ADMIN — Medication 4 MILLIGRAM(S): at 21:37

## 2019-09-10 RX ADMIN — ENOXAPARIN SODIUM 40 MILLIGRAM(S): 100 INJECTION SUBCUTANEOUS at 21:38

## 2019-09-10 RX ADMIN — OXYCODONE HYDROCHLORIDE 10 MILLIGRAM(S): 5 TABLET ORAL at 11:40

## 2019-09-10 RX ADMIN — Medication 4 MILLIGRAM(S): at 05:23

## 2019-09-10 RX ADMIN — Medication 100 MILLIGRAM(S): at 15:16

## 2019-09-10 RX ADMIN — OXYCODONE HYDROCHLORIDE 10 MILLIGRAM(S): 5 TABLET ORAL at 05:23

## 2019-09-10 NOTE — PHYSICAL THERAPY INITIAL EVALUATION ADULT - PRECAUTIONS/LIMITATIONS, REHAB EVAL
MRI C spine: Multilevel degenerative changes resulting in martha spinal cord compression at C3-C4. Abnormal intrinsic cord signal at this level may represent cord edema. Cord impingement at C5-C6 and C6-C7. Broad-based disc protrusion reaches the cord at C4-C5. Multilevel uncinate hypertrophy. C4-C5 moderate bilateral neural foraminal narrowing, C5-C6 mild to moderate left neural foraminal narrowing, C6-C7 moderate left neural foraminal narrowing. MRI acute cord compression: No acute fractures or dislocations. Disc osteophyte complex C3-4 with mild cord compression and possible cord signal change. Small right-sided disc herniation T8-9 without cord compression. L5-S1 disc herniation and degenerative disc disease with moderate thecal sac compression./swallowing precautions

## 2019-09-10 NOTE — PROGRESS NOTE ADULT - SUBJECTIVE AND OBJECTIVE BOX
Male  Patient is a 59y old  Male who presents with a chief complaint of s/p fall with neck pain, w b/l numbness and distal weakness (09 Sep 2019 17:06)      HPI:  58yo male pt with PMHx, of HTN, HLD, ambulatory c/o neck/ upper back pain and B/L arm burning tingling pain/ weakness s/p fall one hour ago. Pt stated he slipped and fell hitting upper back on the bathtub. Denies LOC or head injury. Denies headache, dizziness or visual changes. Denies N/V. Denies CP/SOB/ABD pain. Denies pelvic or hip pain. Denies fever, chills or recent sickness. (09 Sep 2019 05:04)    MEDICINE ATTENDING    Pt known to my primary care practice.  Above noted.  Appreciate Dr. Fraser clearance evaluation.  No new complaints this morning      Vital Signs Last 24 Hrs  T(C): 36.5 (10 Sep 2019 05:01), Max: 37 (09 Sep 2019 13:52)  T(F): 97.7 (10 Sep 2019 05:01), Max: 98.6 (09 Sep 2019 13:52)  HR: 74 (10 Sep 2019 05:01) (68 - 76)  BP: 148/97 (10 Sep 2019 05:01) (127/78 - 148/97)  BP(mean): --  RR: 18 (10 Sep 2019 05:01) (17 - 18)  SpO2: 96% (10 Sep 2019 05:01) (95% - 96%)  Daily     Daily     09-09 @ 07:01  -  09-10 @ 07:00  --------------------------------------------------------  IN: 1300 mL / OUT: 0 mL / NET: 1300 mL      PHYSICAL EXAM:  HEAD:  Atraumatic, Normocephalic  NECK: Supple, No   JVD  CHEST/LUNG:   no     rales,     no,    rhonchi  HEART: Regular rate and rhythm;         murmur  ABDOMEN: Soft, Nontender, ;   EXTREMITIES:     no   edema  NEUROLOGY:  alert  right  hand weakness/ able to raise right arm                              15.1   7.6   )-----------( 160      ( 08 Sep 2019 20:18 )             44.2     09-08    140  |  103  |  13  ----------------------------<  108<H>  4.1   |  25  |  1.17    Ca    10.1      08 Sep 2019 20:18    TPro  7.6  /  Alb  4.7  /  TBili  0.4  /  DBili  x   /  AST  31  /  ALT  22  /  AlkPhos  112  09-08      PT/INR - ( 08 Sep 2019 20:18 )   PT: 11.7 sec;   INR: 1.02 ratio         PTT - ( 08 Sep 2019 20:18 )  PTT:32.5 sec    MEDICATIONS  (STANDING):  atorvastatin 20 milliGRAM(s) Oral at bedtime  dexamethasone     Tablet 4 milliGRAM(s) Oral three times a day  docusate sodium 100 milliGRAM(s) Oral three times a day  enoxaparin Injectable 40 milliGRAM(s) SubCutaneous at bedtime  gabapentin 300 milliGRAM(s) Oral three times a day  influenza   Vaccine 0.5 milliLiter(s) IntraMuscular once  losartan 25 milliGRAM(s) Oral daily    MEDICATIONS  (PRN):  acetaminophen   Tablet .. 650 milliGRAM(s) Oral every 6 hours PRN Mild Pain (1 - 3)  bisacodyl 5 milliGRAM(s) Oral daily PRN Constipation  oxyCODONE    IR 5 milliGRAM(s) Oral every 4 hours PRN Moderate Pain (4 - 6)  oxyCODONE    IR 10 milliGRAM(s) Oral every 4 hours PRN Severe Pain (7 - 10)  senna 2 Tablet(s) Oral at bedtime PRN Constipation    < from: CT Thoracic Spine No Cont (09.08.19 @ 20:33) >  EXAM:  CT THORACIC SPINE                          EXAM:  CT CERVICAL SPINE                            PROCEDURE DATE:  09/08/2019            INTERPRETATION:  CLINICAL INDICATION: Status post fall.    COMPARISON: None    TECHNIQUE: Axial CT images of the cervical spine and thoracic spine were   obtained without the use of intravenous contrast. Coronal and sagittal   reconstructions are provided.    FINDINGS:     Cervical spine: The normal cervical lordosis is maintained.  There is no   spondylolisthesis.  There is no acute displaced fracture.  There is no   prevertebral soft tissue swelling.  The vertebral body heights are   maintained. Degenerative disc disease and facet arthropathy results in   multilevel mild to moderate canal stenosis and multilevel foraminal   narrowing of varying degrees, most pronounced at C3-C4.    Thoracic spine: No acute displaced fracture is identified. There is mild   exaggerated kyphosis. There is no spondylolisthesis. The vertebral body   heights and disc spaces are maintained. Subsegmental atelectasis is noted   at the left lung base. Mild aortic atherosclerotic calcifications are   seen. There are nonobstructing bilateral renal calculi.    < from: MR Acute Spinal Cord Compression (09.08.19 @ 23:39) >    EXAM:  MR ACUTE CORD COMPRESSION                            PROCEDURE DATE:  09/08/2019            INTERPRETATION:    CLINICAL INDICATION: Post fall, traumatic myelopathy, bilateral numbness   and weakness, neck pain    Magnetic resonance imaging of the cervical, thoracic, and lumbosacral   spine was carried out with sagittal surface coil imaging from C1 to S3-S4   the acute cord compression protocol sagittal T1 and STIR imaging with   axial T1 and T2-weighted series through the cervical spine.    Comparison is made with the prior cervical and thoracic spine CT of   9/8/2019.          The cervical vertebrae are normal in height and signal intensity. No   acute fractures or dislocations are identified. There is a disc   osteophyte complex present at C3-4 which narrows the ventral subarachnoid   space and mildly impinges on the ventral aspect of the cord. There may be   some mild cord signal abnormality present. There is left lateral recess   and left neural foraminal stenosis. Mild degenerative changes are also   identified at C5-6 with mild spinal stenosis. The remainder of the   cervical spine is unremarkable.    The thoracic vertebral bodies are normal in height and signal intensity.   There is a small right-sided disc herniation at the T8-9 level which does   not cause cord compression. The thoracic cord is normal in size, contour,   and signal characteristics. The conus terminates normally at the T12-L1   level.    The lumbar vertebral bodies are normal in height and signal intensity.   There is low signal intensity on the T2-weighted images at the L5-S1   level with a central disc herniation which moderately compresses the   ventral aspect of the thecal sac.          IMPRESSION: No acute fractures or dislocations. Disc osteophyte complex   C3-4 with mild cord compression and possible cord signal change. Small   right-sided disc herniation T8-9 without cord compression. L5-S1 disc   herniation and degenerative disc disease with moderate thecal sac   compression.    Dr. Hicks discussed these findings with Dr. Weinberg on 9/9/2019 12:15 AM   with read back.    < end of copied text >  IMPRESSION:     1. No acute displaced fracture.  2. Degenerative changes within the cervical spine with significant   multilevel stenosis. Nonurgent MRI is recommended for further   characterization.    < end of copied text >              Assessment and Plan:   · Assessment	    59 M   with h/o HTN. HLD     S/p fall and hit his neck, now with b/l UE tingling and numbness.   CT C and T spine negative for any acute injuries.   L UE proximal 4+/5, HG 4-/5 L weaker than right, tingling numbness down both arms,. Otherwise neurologically intact, pe r neuro surgery  -MRI C and T spine ,,  no fx/     C3/4.  mild  cord  compression /   Right disc   herniation T8/9,  no cord compression.  for dedicated MRI C spine today.  Further decision on need for surgery this admission vs. OP.    Trell Diop MD  [phone]

## 2019-09-10 NOTE — PHYSICAL THERAPY INITIAL EVALUATION ADULT - ADDITIONAL COMMENTS
Pt lives in a private house with spouse with one flight of steps inside. Pt was Ind with all ADLs and amb without AD.

## 2019-09-10 NOTE — PROGRESS NOTE ADULT - ASSESSMENT
58yo male pt with PMHx, of HTN, HLD, ambulatory c/o neck/ upper back pain and B/L arm burning tingling pain/ weakness s/p fall one hour ago. Pt stated he slipped and fell hitting upper back on the bathtub. Denies LOC or head injury. Denies headache, dizziness or visual changes. Denies N/V. Denies CP/SOB/ABD pain. Denies pelvic or hip pain. Denies fever, chills or recent sickness. (09 Sep 2019 05:04)    PROCEDURE:  Adm 9/9 S/P fall in [place].  MRI revealed C-spine stenosis  POD#NA    PLAN:  Neuro: [place]-J on at all times. Decadron 4mg Q8hr. MRI C-Spine w/o-P FU. Surgical plans after imaging.  BP controlled. Inc activity/OOB.     Med-for dedicated MRI C spine today. Further decision on need for surgery this admission vs. OP. Trell Diop  [phone]    Respiratory: Patient instructed to use incentive spirometer [ ] YES [X ] NO              DVT ppx: [X ] SQL [ ] SQH and Venodynes [ ] Left [ ] Right [ X] Bilateral    Discharge Planning: PT eval Postop FU. PMR note 9/10-Dispo- When stable and sx w/u and plan complete will need outpt rehab.     Assessment:  Please Check When Present   []  GCS  E   V  M     Heart Failure: []Acute, [] acute on chronic , []chronic  Heart Failure:  [] Diastolic (HFpEF), [] Systolic (HFrEF), []Combined (HFpEF and HFrEF), [] RHF, [] Pulm HTN, [] Other    [] ELEONORA, [] ATN, [] AIN, [] other  [] CKD1, [] CKD2, [] CKD 3, [] CKD 4, [] CKD 5, []ESRD    Encephalopathy: [] Metabolic, [] Hepatic, [] toxic, [] Neurological, [] Other    Abnormal Nurtitional Status: [] malnurtition (see nutrition note), [ ]underweight: BMI < 19, [] morbid obesity: BMI >40, [] Cachexia    [] Sepsis  [] hypovolemic shock,[] cardiogenic shock, [] hemorrhagic shock, [] neuogenic shock  [] Acute Respiratory Failure  []Cerebral edema, [] Brain compression/ herniation,   [] Functional quadriplegia  [] Acute blood loss anemia

## 2019-09-10 NOTE — PROGRESS NOTE ADULT - SUBJECTIVE AND OBJECTIVE BOX
SUBJECTIVE: Doing well, NAD. LUE pain improved with meds. [place] Collar on.     OVERNIGHT EVENTS: None    Vital Signs Last 24 Hrs  T(C): 36.4 (10 Sep 2019 09:25), Max: 37 (09 Sep 2019 13:52)  T(F): 97.5 (10 Sep 2019 09:25), Max: 98.6 (09 Sep 2019 13:52)  HR: 81 (10 Sep 2019 09:25) (68 - 81)  BP: 125/75 (10 Sep 2019 09:25) (125/75 - 148/97)  BP(mean): --  RR: 18 (10 Sep 2019 09:25) (17 - 18)  SpO2: 94% (10 Sep 2019 09:25) (94% - 96%)  IVF: [ X] IVL [ ] NS+K@   DIET: [X ] Regular [ ] CCD [ ] Renal [ ] Puree [ ] Dysphagia [ ] Tube Feeds:   PCA: [ ] YES [X ] NO   SUBRAMANIAN: [ ] YES [X ] NO [X ] VOID   BM: [ ] YES [X ] NO     DRAINS: NA    PHYSICAL EXAM:    Constitutional: No Acute Distress     Neurological: No interval change. AOx3, Following Commands, Moving all Extremities     Motor exam:          Upper extremity                         Delt     Bicep     Tricep    HG                                                 R         5/5        5/5        5/5       4/5                                               L          5/5        5/5        5/5       4/5          Lower extremity                        HF         KF        KE       DF         PF                                                  R        5/5        5/5       5/5      5/5         5/5                                               L         5/5        5/5      5/5      5/5          5/5                                                 Sensation: [X] intact to light touch  [] decreased:     Pulmonary: Clear to Auscultation, No rales, No rhonchi, No wheezes     Cardiovascular: S1, S2, Regular rate and rhythm     Gastrointestinal: Soft, Non-tender, Non-distended     Extremities: No calf tenderness     Incision: NA    LABS:                        15.1   7.6   )-----------( 160      ( 08 Sep 2019 20:18 )             44.2    09-08    140  |  103  |  13  ----------------------------<  108<H>  4.1   |  25  |  1.17    Ca    10.1      08 Sep 2019 20:18    TPro  7.6  /  Alb  4.7  /  TBili  0.4  /  DBili  x   /  AST  31  /  ALT  22  /  AlkPhos  112  09-08  PT/INR - ( 08 Sep 2019 20:18 )   PT: 11.7 sec;   INR: 1.02 ratio    PTT - ( 08 Sep 2019 20:18 )  PTT:32.5 sec    IMAGING:  < from: MR Acute Spinal Cord Compression (09.08.19 @ 23:39) >  IMPRESSION: No acute fractures or dislocations. Disc osteophyte complex   C3-4 with mild cord compression and possible cord signal change. Small   right-sided disc herniation T8-9 without cord compression. L5-S1 disc   herniation and degenerative disc disease with moderate thecal sac   compression.    < from: CT Thoracic Spine No Cont (09.08.19 @ 20:33) >  1. No acute displaced fracture.  2. Degenerative changes within the cervical spine with significant   multilevel stenosis. Nonurgent MRI is recommended for further   characterization.    < from: Xray Chest 1 View- PORTABLE-Routine (09.09.19 @ 18:12) >  Clear lungs. No pleural effusion or pneumothorax.    The cardiomediastinal silhouette is normal in size and contour.    MEDICATIONS  (STANDING):  atorvastatin 20 milliGRAM(s) Oral at bedtime  dexamethasone     Tablet 4 milliGRAM(s) Oral three times a day  docusate sodium 100 milliGRAM(s) Oral three times a day  enoxaparin Injectable 40 milliGRAM(s) SubCutaneous at bedtime  gabapentin 300 milliGRAM(s) Oral three times a day  influenza   Vaccine 0.5 milliLiter(s) IntraMuscular once  losartan 25 milliGRAM(s) Oral daily    MEDICATIONS  (PRN):  acetaminophen   Tablet .. 650 milliGRAM(s) Oral every 6 hours PRN Mild Pain (1 - 3)  bisacodyl 5 milliGRAM(s) Oral daily PRN Constipation  oxyCODONE    IR 5 milliGRAM(s) Oral every 4 hours PRN Moderate Pain (4 - 6)  oxyCODONE    IR 10 milliGRAM(s) Oral every 4 hours PRN Severe Pain (7 - 10)  senna 2 Tablet(s) Oral at bedtime PRN Constipation

## 2019-09-10 NOTE — PHYSICAL THERAPY INITIAL EVALUATION ADULT - PERTINENT HX OF CURRENT PROBLEM, REHAB EVAL
Pt is a 58 y/o male admitted to [facility] on 9/9/19 PMHx, of HTN, HLD, ambulatory c/o neck/ upper back pain and B/L arm burning tingling pain/ weakness s/p fall one hour ago. Pt stated he slipped and fell hitting upper back on the bathtub. Denies LOC or head injury. Denies headache, dizziness or visual changes.

## 2019-09-10 NOTE — PROGRESS NOTE ADULT - SUBJECTIVE AND OBJECTIVE BOX
Cc: Patient is a 59y old  Male who presents with a chief complaint of s/p fall with neck pain, w b/l numbness and distal weakness (09 Sep 2019 12:03)    HPI:  Still with some neck and arm pain.  Left hand weak.  No Bowel or bladder issues.  No CP, no SOB    Vital Signs Last 24 Hrs  T(C): 36.4 (10 Sep 2019 09:25), Max: 37 (09 Sep 2019 13:52)  T(F): 97.5 (10 Sep 2019 09:25), Max: 98.6 (09 Sep 2019 13:52)  HR: 81 (10 Sep 2019 09:25) (68 - 81)  BP: 125/75 (10 Sep 2019 09:25) (125/75 - 148/97)  BP(mean): --  RR: 18 (10 Sep 2019 09:25) (17 - 18)  SpO2: 94% (10 Sep 2019 09:25) (94% - 96%)    MEDICATIONS  (STANDING):  atorvastatin 20 milliGRAM(s) Oral at bedtime  dexamethasone     Tablet 4 milliGRAM(s) Oral three times a day  docusate sodium 100 milliGRAM(s) Oral three times a day  enoxaparin Injectable 40 milliGRAM(s) SubCutaneous at bedtime  gabapentin 300 milliGRAM(s) Oral three times a day  influenza   Vaccine 0.5 milliLiter(s) IntraMuscular once  losartan 25 milliGRAM(s) Oral daily    MEDICATIONS  (PRN):  acetaminophen   Tablet .. 650 milliGRAM(s) Oral every 6 hours PRN Mild Pain (1 - 3)  bisacodyl 5 milliGRAM(s) Oral daily PRN Constipation  oxyCODONE    IR 5 milliGRAM(s) Oral every 4 hours PRN Moderate Pain (4 - 6)  oxyCODONE    IR 10 milliGRAM(s) Oral every 4 hours PRN Severe Pain (7 - 10)  senna 2 Tablet(s) Oral at bedtime PRN Constipation      PHYSICAL EXAM  Constitutional - NAD, Comfortable  HEENT - NCAT, EOMI  Neck - Cervical collar in place  Chest - CTA bilaterally, No wheeze, No rhonchi, No crackles  Cardiovascular - RRR, S1S2, No murmurs  Abdomen - BS+, Soft, NTND  Extremities - No C/C/E, No calf tenderness   Neurologic Exam -                   AAO x 3    Motor- 5/5 at all dermatomes of bl LEs and RUE, 5/5 at  L EF/WE/EE/Fabd, 4/5 L FFs   Psychiatric - Mood stable, Affect WNL    Impression:  60 yo with functional deficits secondary to diagnosis of spinal stenosis    Plan:  PT- ROM, Bed Mob, Transfers, Amb w AD   OT- ADLs, bracing  Prec- Falls, Cardiac, cervical collar  DVT Prophylaxis- lovenox  Skin- Turn q2 h  Pain- Continue Gabapentin  Dispo- When stable and sx w/u and plan complete will need outpt rehab.

## 2019-09-11 LAB
GLUCOSE BLDC GLUCOMTR-MCNC: 142 MG/DL — HIGH (ref 70–99)
GLUCOSE BLDC GLUCOMTR-MCNC: 182 MG/DL — HIGH (ref 70–99)
GLUCOSE BLDC GLUCOMTR-MCNC: 190 MG/DL — HIGH (ref 70–99)
GLUCOSE BLDC GLUCOMTR-MCNC: 195 MG/DL — HIGH (ref 70–99)

## 2019-09-11 PROCEDURE — 99232 SBSQ HOSP IP/OBS MODERATE 35: CPT

## 2019-09-11 PROCEDURE — 99231 SBSQ HOSP IP/OBS SF/LOW 25: CPT

## 2019-09-11 RX ADMIN — Medication 100 MILLIGRAM(S): at 21:24

## 2019-09-11 RX ADMIN — ENOXAPARIN SODIUM 40 MILLIGRAM(S): 100 INJECTION SUBCUTANEOUS at 21:23

## 2019-09-11 RX ADMIN — Medication 100 MILLIGRAM(S): at 13:38

## 2019-09-11 RX ADMIN — Medication 1: at 13:36

## 2019-09-11 RX ADMIN — GABAPENTIN 300 MILLIGRAM(S): 400 CAPSULE ORAL at 05:53

## 2019-09-11 RX ADMIN — Medication 4 MILLIGRAM(S): at 05:53

## 2019-09-11 RX ADMIN — Medication 100 MILLIGRAM(S): at 05:53

## 2019-09-11 RX ADMIN — OXYCODONE HYDROCHLORIDE 5 MILLIGRAM(S): 5 TABLET ORAL at 09:44

## 2019-09-11 RX ADMIN — Medication 1: at 22:22

## 2019-09-11 RX ADMIN — GABAPENTIN 300 MILLIGRAM(S): 400 CAPSULE ORAL at 21:24

## 2019-09-11 RX ADMIN — OXYCODONE HYDROCHLORIDE 5 MILLIGRAM(S): 5 TABLET ORAL at 10:20

## 2019-09-11 RX ADMIN — GABAPENTIN 300 MILLIGRAM(S): 400 CAPSULE ORAL at 13:38

## 2019-09-11 RX ADMIN — LOSARTAN POTASSIUM 25 MILLIGRAM(S): 100 TABLET, FILM COATED ORAL at 05:53

## 2019-09-11 RX ADMIN — Medication 1: at 18:41

## 2019-09-11 RX ADMIN — SENNA PLUS 2 TABLET(S): 8.6 TABLET ORAL at 11:52

## 2019-09-11 RX ADMIN — Medication 4 MILLIGRAM(S): at 21:24

## 2019-09-11 RX ADMIN — ATORVASTATIN CALCIUM 20 MILLIGRAM(S): 80 TABLET, FILM COATED ORAL at 21:24

## 2019-09-11 RX ADMIN — Medication 4 MILLIGRAM(S): at 13:38

## 2019-09-11 NOTE — PROGRESS NOTE ADULT - SUBJECTIVE AND OBJECTIVE BOX
Sitting up in chair.  In some pain.  No new deficit complaints    	  Vital Signs Last 24 Hrs  T(C): 36.9 (11 Sep 2019 05:27), Max: 36.9 (11 Sep 2019 05:27)  T(F): 98.5 (11 Sep 2019 05:27), Max: 98.5 (11 Sep 2019 05:27)  HR: 77 (11 Sep 2019 05:27) (77 - 84)  BP: 134/83 (11 Sep 2019 05:27) (125/75 - 140/78)  BP(mean): --  RR: 18 (11 Sep 2019 05:27) (18 - 18)  SpO2: 97% (11 Sep 2019 05:27) (92% - 97%)  Daily     Daily     09-10 @ 07:01  -  09-11 @ 07:00  --------------------------------------------------------  IN: 1630 mL / OUT: 0 mL / NET: 1630 mL            PHYSICAL EXAM:  HEAD:  Atraumatic, Normocephalic  NECK: Supple, No   JVD  CHEST/LUNG:   no     rales,     no,    rhonchi  HEART: Regular rate and rhythm;         murmur  ABDOMEN: Soft, Nontender, ;   EXTREMITIES:     no   edema  NEUROLOGY:  alert  right  hand weakness/ able to raise right arm. worse L arm weakness  Nl LE strength B/L                  MEDICATIONS  (STANDING):  atorvastatin 20 milliGRAM(s) Oral at bedtime  dexamethasone     Tablet 4 milliGRAM(s) Oral three times a day  docusate sodium 100 milliGRAM(s) Oral three times a day  enoxaparin Injectable 40 milliGRAM(s) SubCutaneous at bedtime  gabapentin 300 milliGRAM(s) Oral three times a day  influenza   Vaccine 0.5 milliLiter(s) IntraMuscular once  insulin lispro (HumaLOG) corrective regimen sliding scale   SubCutaneous Before meals and at bedtime  losartan 25 milliGRAM(s) Oral daily    MEDICATIONS  (PRN):  acetaminophen   Tablet .. 650 milliGRAM(s) Oral every 6 hours PRN Mild Pain (1 - 3)  bisacodyl 5 milliGRAM(s) Oral daily PRN Constipation  oxyCODONE    IR 5 milliGRAM(s) Oral every 4 hours PRN Moderate Pain (4 - 6)  oxyCODONE    IR 10 milliGRAM(s) Oral every 4 hours PRN Severe Pain (7 - 10)  senna 2 Tablet(s) Oral at bedtime PRN Constipation    < from: MR Cervical Spine No Cont (09.10.19 @ 17:41) >  EXAM:  MR SPINE CERVICAL                            PROCEDURE DATE:  09/10/2019            INTERPRETATION:  Noncontrast MRI of the cervical spine    CLINICAL INDICATION: Bilateral arm weakness, preoperative examination    TECHNIQUE: Multiplanar, multisequence MR images of the cervical spine   were obtained without the administration of intravenous contrast.    COMPARISON: MRI screening spine 9/8/2019. CT thoracic spine 9/8/2019    FINDINGS:    Study is limited by motion.    Vertebral body height, marrow signal homogeneity, and facet alignment are   maintained throughout the visualized spinal segments. There is multilevel   mild disc space narrowing. The cervicomedullary junction is unremarkable.    There is minimal prevertebral edema at C6.    C2-C3: No spinal canal stenosis or neural foraminal narrowing.    C3-C4: Broad-based disc protrusion asymmetric to the left and posterior   ligamentous hypertrophy results in spinal cord compression. Abnormal   intrinsic cord signal is present at this level. Left uncinate hypertrophy   results in mild left neural foraminal narrowing.    C4-C5: Broad-based disc protrusion reaches the cord. Bilateral uncinate   hypertrophy results in moderate bilateral neural foraminal narrowing.    C5-C6: Broad-based disc osteophyte complex asymmetric to the left and   posterior ligamentous hypertrophy both reach the cord, resulting in cord   impingement. Mild to moderate left and mild right neural foraminal   narrowing.    C6-C7: Broad-based disc osteophyte complex asymmetric to the left reaches   the cord, resulting in cord impingement. Left uncinate hypertrophy   results in moderate left neural foraminal narrowing.    C7-T1: No spinal canal stenosis or neural foraminal narrowing.    IMPRESSION:    Multilevel degenerative changes resulting in martha spinal cord   compression at C3-C4. Abnormal intrinsic cord signal at this level may   represent cord edema.    Cord impingement at C5-C6 and C6-C7. Broad-based disc protrusion reaches   the cord at C4-C5.    Multilevel uncinate hypertrophy. C4-C5 moderate bilateral neural   foraminal narrowing, C5-C6 mild to moderate left neural foraminal   narrowing, C6-C7 moderate left neural foraminal narrowing.                    MARKIE SILVA M.D., ATTENDING RADIOLOGIST  This document has been electronically signed. Sep 10 2019  4:00PM    < end of copied text >        Assessment and Plan:   · Assessment	    59 M   with h/o HTN. HLD     S/p fall and hit his neck, now with b/l UE tingling and numbness.   CT C and T spine negative for any acute injuries.  MRI shows cervical cord compression as above   L UE proximal 4+/5, HG 4-/5 L weaker than right, tingling numbness down both arms,. Otherwise neurologically intact, pe r neuro surgery  For NS evaluation of latest MRI.  Decision re surgery.      Trell Diop MD  [phone]

## 2019-09-11 NOTE — PROGRESS NOTE ADULT - SUBJECTIVE AND OBJECTIVE BOX
SUBJECTIVE: In Chair. Doing well, NAD.  Numbness in hands better with steroids. [place]-J Collar on.     OVERNIGHT EVENTS: None    Vital Signs Last 24 Hrs  T(C): 36.6 (11 Sep 2019 09:07), Max: 36.9 (11 Sep 2019 05:27)  T(F): 97.8 (11 Sep 2019 09:07), Max: 98.5 (11 Sep 2019 05:27)  HR: 78 (11 Sep 2019 09:07) (77 - 84)  BP: 152/76 (11 Sep 2019 09:07) (126/73 - 152/76)  BP(mean): --  RR: 18 (11 Sep 2019 09:07) (18 - 18)  SpO2: 95% (11 Sep 2019 09:07) (92% - 97%)  IVF: [ X] IVL [ ] NS+K@   DIET: [X ] Regular [ ] CCD [ ] Renal [ ] Puree [ ] Dysphagia [ ] Tube Feeds:   PCA: [ ] YES [X ] NO   SUBRAMANIAN: [ ] YES [X ] NO [X ] VOID   BM: [ ] YES [X ] NO     DRAINS: NA    PHYSICAL EXAM:    Constitutional: No Acute Distress     Neurological: No interval change. AOx3, Following Commands, Moving all Extremities     Motor exam:          Upper extremity                         Delt     Bicep     Tricep    HG                                                 R         5/5        5/5        5/5       4/5                                               L          5/5        5/5        5/5       4/5          Lower extremity                        HF         KF        KE       DF         PF                                                  R        5/5        5/5       5/5      5/5         5/5                                               L         5/5        5/5      5/5      5/5          5/5                                                 Sensation: [X] intact to light touch  [] decreased:     Pulmonary: Clear to Auscultation, No rales, No rhonchi, No wheezes     Cardiovascular: S1, S2, Regular rate and rhythm     Gastrointestinal: Soft, Non-tender, Non-distended     Extremities: No calf tenderness     Incision: NA    LABS:                        15.1   7.6   )-----------( 160      ( 08 Sep 2019 20:18 )             44.2    09-08    140  |  103  |  13  ----------------------------<  108<H>  4.1   |  25  |  1.17    Ca    10.1      08 Sep 2019 20:18    TPro  7.6  /  Alb  4.7  /  TBili  0.4  /  DBili  x   /  AST  31  /  ALT  22  /  AlkPhos  112  09-08  PT/INR - ( 08 Sep 2019 20:18 )   PT: 11.7 sec;   INR: 1.02 ratio    PTT - ( 08 Sep 2019 20:18 )  PTT:32.5 sec    IMAGING:  < from: MR Acute Spinal Cord Compression (09.08.19 @ 23:39) >  IMPRESSION: No acute fractures or dislocations. Disc osteophyte complex   C3-4 with mild cord compression and possible cord signal change. Small   right-sided disc herniation T8-9 without cord compression. L5-S1 disc   herniation and degenerative disc disease with moderate thecal sac   compression.    < from: CT Thoracic Spine No Cont (09.08.19 @ 20:33) >  1. No acute displaced fracture.  2. Degenerative changes within the cervical spine with significant   multilevel stenosis. Nonurgent MRI is recommended for further   characterization.    < from: MR Cervical Spine No Cont (09.10.19 @ 17:41) >  Multilevel degenerative changes resulting in martha spinal cord   compression at C3-C4. Abnormal intrinsic cord signal at this level may   represent cord edema.    Cord impingement at C5-C6 and C6-C7. Broad-based disc protrusion reaches   the cord at C4-C5.    Multilevel uncinate hypertrophy. C4-C5 moderate bilateral neural   foraminal narrowing, C5-C6 mild to moderate left neural foraminal   narrowing, C6-C7 moderate left neural foraminal narrowing.    < from: Xray Chest 1 View- PORTABLE-Routine (09.09.19 @ 18:12) >  Clear lungs. No pleural effusion or pneumothorax.    The cardiomediastinal silhouette is normal in size and contour.    MEDICATIONS  (STANDING):  atorvastatin 20 milliGRAM(s) Oral at bedtime  dexamethasone     Tablet 4 milliGRAM(s) Oral three times a day  docusate sodium 100 milliGRAM(s) Oral three times a day  enoxaparin Injectable 40 milliGRAM(s) SubCutaneous at bedtime  gabapentin 300 milliGRAM(s) Oral three times a day  influenza   Vaccine 0.5 milliLiter(s) IntraMuscular once  insulin lispro (HumaLOG) corrective regimen sliding scale   SubCutaneous Before meals and at bedtime  losartan 25 milliGRAM(s) Oral daily    MEDICATIONS  (PRN):  acetaminophen   Tablet .. 650 milliGRAM(s) Oral every 6 hours PRN Mild Pain (1 - 3)  bisacodyl 5 milliGRAM(s) Oral daily PRN Constipation  oxyCODONE    IR 5 milliGRAM(s) Oral every 4 hours PRN Moderate Pain (4 - 6)  oxyCODONE    IR 10 milliGRAM(s) Oral every 4 hours PRN Severe Pain (7 - 10)  senna 2 Tablet(s) Oral at bedtime PRN Constipation

## 2019-09-11 NOTE — PROGRESS NOTE ADULT - ASSESSMENT
60yo male pt with PMHx, of HTN, HLD, ambulatory c/o neck/ upper back pain and B/L arm burning tingling pain/ weakness s/p fall one hour ago. Pt stated he slipped and fell hitting upper back on the bathtub. Denies LOC or head injury. Denies headache, dizziness or visual changes. Denies N/V. Denies CP/SOB/ABD pain. Denies pelvic or hip pain. Denies fever, chills or recent sickness. (09 Sep 2019 05:04)    PROCEDURE:  Adm 9/9 S/P fall in [place].  MRI revealed C-spine stenosis  POD#NA    PLAN:  Neuro: [place]-J on at all times. Cont Decadron. Surgical plans to be d/w Dr Winslow today/tomorrow FU.  BP controlled. Inc activity/OOB.     Med note [birth year] M   with h/o HTN. HLD.  S/p fall and hit his neck, now with b/l UE tingling and numbness.  CT C and T spine negative for any acute injuries. MRI shows cervical cord compression as above.  L UE proximal 4+/5, HG 4-/5 L weaker than right, tingling numbness down both arms,. Otherwise neurologically intact, per neuro surgery. For NS evaluation of latest MRI.  Decision re surgery. Trell Diop  [phone]    Respiratory: Patient instructed to use incentive spirometer [ ] YES [X ] NO              DVT ppx: [X ] SQL [ ] SQH and Venodynes [ ] Left [ ] Right [ X] Bilateral    Discharge Planning: PT eval Postop FU. PMR note 9/10-Dispo- When stable and sx w/u and plan complete will need outpt rehab.     Assessment:  Please Check When Present   []  GCS  E   V  M     Heart Failure: []Acute, [] acute on chronic , []chronic  Heart Failure:  [] Diastolic (HFpEF), [] Systolic (HFrEF), []Combined (HFpEF and HFrEF), [] RHF, [] Pulm HTN, [] Other    [] ELEONORA, [] ATN, [] AIN, [] other  [] CKD1, [] CKD2, [] CKD 3, [] CKD 4, [] CKD 5, []ESRD    Encephalopathy: [] Metabolic, [] Hepatic, [] toxic, [] Neurological, [] Other    Abnormal Nurtitional Status: [] malnurtition (see nutrition note), [ ]underweight: BMI < 19, [] morbid obesity: BMI >40, [] Cachexia    [] Sepsis  [] hypovolemic shock,[] cardiogenic shock, [] hemorrhagic shock, [] neuogenic shock  [] Acute Respiratory Failure  []Cerebral edema, [] Brain compression/ herniation,   [] Functional quadriplegia  [] Acute blood loss anemia

## 2019-09-12 ENCOUNTER — TRANSCRIPTION ENCOUNTER (OUTPATIENT)
Age: 59
End: 2019-09-12

## 2019-09-12 VITALS
RESPIRATION RATE: 18 BRPM | TEMPERATURE: 98 F | OXYGEN SATURATION: 97 % | HEART RATE: 69 BPM | DIASTOLIC BLOOD PRESSURE: 81 MMHG | SYSTOLIC BLOOD PRESSURE: 133 MMHG

## 2019-09-12 PROBLEM — Z00.00 ENCOUNTER FOR PREVENTIVE HEALTH EXAMINATION: Status: ACTIVE | Noted: 2019-09-12

## 2019-09-12 PROBLEM — E78.5 HYPERLIPIDEMIA, UNSPECIFIED: Chronic | Status: ACTIVE | Noted: 2019-09-09

## 2019-09-12 PROBLEM — I10 ESSENTIAL (PRIMARY) HYPERTENSION: Chronic | Status: ACTIVE | Noted: 2019-09-09

## 2019-09-12 LAB
GLUCOSE BLDC GLUCOMTR-MCNC: 118 MG/DL — HIGH (ref 70–99)
GLUCOSE BLDC GLUCOMTR-MCNC: 161 MG/DL — HIGH (ref 70–99)

## 2019-09-12 PROCEDURE — 82962 GLUCOSE BLOOD TEST: CPT

## 2019-09-12 PROCEDURE — 86901 BLOOD TYPING SEROLOGIC RH(D): CPT

## 2019-09-12 PROCEDURE — 72125 CT NECK SPINE W/O DYE: CPT

## 2019-09-12 PROCEDURE — 86900 BLOOD TYPING SEROLOGIC ABO: CPT

## 2019-09-12 PROCEDURE — 86850 RBC ANTIBODY SCREEN: CPT

## 2019-09-12 PROCEDURE — 80053 COMPREHEN METABOLIC PANEL: CPT

## 2019-09-12 PROCEDURE — 72128 CT CHEST SPINE W/O DYE: CPT

## 2019-09-12 PROCEDURE — 81001 URINALYSIS AUTO W/SCOPE: CPT

## 2019-09-12 PROCEDURE — 72141 MRI NECK SPINE W/O DYE: CPT

## 2019-09-12 PROCEDURE — 86803 HEPATITIS C AB TEST: CPT

## 2019-09-12 PROCEDURE — 99239 HOSP IP/OBS DSCHRG MGMT >30: CPT

## 2019-09-12 PROCEDURE — 85610 PROTHROMBIN TIME: CPT

## 2019-09-12 PROCEDURE — 93005 ELECTROCARDIOGRAM TRACING: CPT

## 2019-09-12 PROCEDURE — 85027 COMPLETE CBC AUTOMATED: CPT

## 2019-09-12 PROCEDURE — 96374 THER/PROPH/DIAG INJ IV PUSH: CPT

## 2019-09-12 PROCEDURE — 97161 PT EVAL LOW COMPLEX 20 MIN: CPT

## 2019-09-12 PROCEDURE — 99285 EMERGENCY DEPT VISIT HI MDM: CPT | Mod: 25

## 2019-09-12 PROCEDURE — 85730 THROMBOPLASTIN TIME PARTIAL: CPT

## 2019-09-12 PROCEDURE — 76498 UNLISTED MR PROCEDURE: CPT

## 2019-09-12 PROCEDURE — 71045 X-RAY EXAM CHEST 1 VIEW: CPT

## 2019-09-12 RX ORDER — GABAPENTIN 400 MG/1
1 CAPSULE ORAL
Qty: 21 | Refills: 0
Start: 2019-09-12

## 2019-09-12 RX ORDER — DOCUSATE SODIUM 100 MG
1 CAPSULE ORAL
Qty: 21 | Refills: 0
Start: 2019-09-12

## 2019-09-12 RX ORDER — SENNA PLUS 8.6 MG/1
2 TABLET ORAL
Qty: 0 | Refills: 0 | DISCHARGE
Start: 2019-09-12

## 2019-09-12 RX ORDER — DEXAMETHASONE 0.5 MG/5ML
2 ELIXIR ORAL
Qty: 12 | Refills: 0
Start: 2019-09-12

## 2019-09-12 RX ORDER — OXYCODONE HYDROCHLORIDE 5 MG/1
1 TABLET ORAL
Qty: 30 | Refills: 0
Start: 2019-09-12

## 2019-09-12 RX ORDER — ACETAMINOPHEN 500 MG
2 TABLET ORAL
Qty: 0 | Refills: 0 | DISCHARGE
Start: 2019-09-12

## 2019-09-12 RX ORDER — SENNA PLUS 8.6 MG/1
2 TABLET ORAL
Qty: 14 | Refills: 0
Start: 2019-09-12

## 2019-09-12 RX ORDER — DOCUSATE SODIUM 100 MG
1 CAPSULE ORAL
Qty: 0 | Refills: 0 | DISCHARGE
Start: 2019-09-12

## 2019-09-12 RX ORDER — ACETAMINOPHEN 500 MG
2 TABLET ORAL
Qty: 30 | Refills: 0
Start: 2019-09-12

## 2019-09-12 RX ADMIN — Medication 100 MILLIGRAM(S): at 14:22

## 2019-09-12 RX ADMIN — Medication 100 MILLIGRAM(S): at 05:35

## 2019-09-12 RX ADMIN — Medication 4 MILLIGRAM(S): at 05:35

## 2019-09-12 RX ADMIN — Medication 1: at 14:22

## 2019-09-12 RX ADMIN — GABAPENTIN 300 MILLIGRAM(S): 400 CAPSULE ORAL at 14:22

## 2019-09-12 RX ADMIN — Medication 4 MILLIGRAM(S): at 14:22

## 2019-09-12 RX ADMIN — LOSARTAN POTASSIUM 25 MILLIGRAM(S): 100 TABLET, FILM COATED ORAL at 05:35

## 2019-09-12 RX ADMIN — GABAPENTIN 300 MILLIGRAM(S): 400 CAPSULE ORAL at 05:35

## 2019-09-12 NOTE — DISCHARGE NOTE NURSING/CASE MANAGEMENT/SOCIAL WORK - NSDCPNINST_GEN_ALL_CORE
NOTIFY YOUR DOCTOR IF: You have any bleeding that does not stop, any fever (over 100.4 F) or chills, persistent nausea/vomiting, persistent diarrhea, or if your pain is not controlled on your discharge pain medications.

## 2019-09-12 NOTE — PROGRESS NOTE ADULT - PROVIDER SPECIALTY LIST ADULT
Internal Medicine
Neurosurgery
Internal Medicine
Rehab Medicine
Neurosurgery

## 2019-09-12 NOTE — DISCHARGE NOTE NURSING/CASE MANAGEMENT/SOCIAL WORK - NSDCFUADDAPPT_GEN_ALL_CORE_FT
Please follow up with Presurgical testing on 9/13 at 10 am.     Return to ER immediately for any of the following: fever, bleeding, new onset numbness/tingling/weakness, nausea and/or vomiting, chest pain, shortness of breath, confusion, seizure, altered mental status, urinary and/or fecal incontinence or retention.    NO heavy lifting, strenuous activity, twisting, bending, driving, or working until cleared by your physician.    Please make an appointment for follow up with your primary care physician after discharge.

## 2019-09-12 NOTE — DISCHARGE NOTE PROVIDER - NSDCACTIVITY_GEN_ALL_CORE
Do not drive or operate machinery/Stairs allowed/Walking - Outdoors allowed/No heavy lifting/straining/Walking - Indoors allowed/No restrictions

## 2019-09-12 NOTE — DISCHARGE NOTE PROVIDER - NSDCCPCAREPLAN_GEN_ALL_CORE_FT
PRINCIPAL DISCHARGE DIAGNOSIS  Diagnosis: Cervical neuropathic pain  Assessment and Plan of Treatment: Awaiting for C3-C5 laminectomy, C3-T1 fusion on 9/17      SECONDARY DISCHARGE DIAGNOSES  Diagnosis: Upper back pain  Assessment and Plan of Treatment: On decadron and pain regimen

## 2019-09-12 NOTE — DISCHARGE NOTE PROVIDER - NSDCFUADDAPPT_GEN_ALL_CORE_FT
Please follow up with Presurgical testing on 9/13 at 10 am.     Return to ER immediately for any of the following: fever, bleeding, new onset numbness/tingling/weakness, nausea and/or vomiting, chest pain, shortness of breath, confusion, seizure, altered mental status, urinary and/or fecal incontinence or retention.    NO heavy lifting, strenuous activity, twisting, bending, driving, or working until cleared by your physician. Please follow up with Presurgical testing on 9/13 at 10 am.     Return to ER immediately for any of the following: fever, bleeding, new onset numbness/tingling/weakness, nausea and/or vomiting, chest pain, shortness of breath, confusion, seizure, altered mental status, urinary and/or fecal incontinence or retention.    NO heavy lifting, strenuous activity, twisting, bending, driving, or working until cleared by your physician.    Please make an appointment for follow up with your primary care physician after discharge.

## 2019-09-12 NOTE — PROGRESS NOTE ADULT - SUBJECTIVE AND OBJECTIVE BOX
SUBJECTIVE: Patient seen and evaluated at bedside. No overnight events. Patient states that he feels that his strength and numbnes has improved. He denies any pain or discomfort. He denies chest pain, shortness of breath or abdominal pain.     Vital Signs Last 24 Hrs  T(C): 37 (12 Sep 2019 13:33), Max: 37 (11 Sep 2019 17:05)  T(F): 98.6 (12 Sep 2019 13:33), Max: 98.6 (11 Sep 2019 17:05)  HR: 80 (12 Sep 2019 13:33) (70 - 80)  BP: 139/79 (12 Sep 2019 13:33) (136/76 - 155/91)  BP(mean): --  RR: 18 (12 Sep 2019 13:33) (18 - 18)  SpO2: 95% (12 Sep 2019 13:33) (95% - 98%)  DIET: [X ] Regular [ ] CCD [ ] Renal [ ] Puree [ ] Dysphagia [ ] Tube Feeds:     DRAINS: NA    PHYSICAL EXAM:    Constitutional: No Acute Distress     Neurological: No interval change. AOx3, Following Commands, Moving all Extremities     Motor exam:          Upper extremity                         Delt     Bicep     Tricep    HG                                                 R         5/5        5/5        5/5       4/5                                               L          5/5        5/5        5/5       4/5          Lower extremity                        HF         KF        KE       DF         PF                                                  R        5/5        5/5       5/5      5/5         5/5                                               L         5/5        5/5      5/5      5/5          5/5                                                 Sensation: [X] intact to light touch  [] decreased:     Pulmonary: Clear to Auscultation, No rales, No rhonchi, No wheezes     Cardiovascular: S1, S2, Regular rate and rhythm     Gastrointestinal: Soft, Non-tender, Non-distended     Extremities: No calf tenderness     Incision: NA    LABS:                        15.1   7.6   )-----------( 160      ( 08 Sep 2019 20:18 )             44.2    09-08    140  |  103  |  13  ----------------------------<  108<H>  4.1   |  25  |  1.17    Ca    10.1      08 Sep 2019 20:18    TPro  7.6  /  Alb  4.7  /  TBili  0.4  /  DBili  x   /  AST  31  /  ALT  22  /  AlkPhos  112  09-08  PT/INR - ( 08 Sep 2019 20:18 )   PT: 11.7 sec;   INR: 1.02 ratio    PTT - ( 08 Sep 2019 20:18 )  PTT:32.5 sec    IMAGING:  < from: MR Acute Spinal Cord Compression (09.08.19 @ 23:39) >  IMPRESSION: No acute fractures or dislocations. Disc osteophyte complex   C3-4 with mild cord compression and possible cord signal change. Small   right-sided disc herniation T8-9 without cord compression. L5-S1 disc   herniation and degenerative disc disease with moderate thecal sac   compression.    < from: CT Thoracic Spine No Cont (09.08.19 @ 20:33) >  1. No acute displaced fracture.  2. Degenerative changes within the cervical spine with significant   multilevel stenosis. Nonurgent MRI is recommended for further   characterization.    < from: MR Cervical Spine No Cont (09.10.19 @ 17:41) >  Multilevel degenerative changes resulting in martha spinal cord   compression at C3-C4. Abnormal intrinsic cord signal at this level may   represent cord edema.    Cord impingement at C5-C6 and C6-C7. Broad-based disc protrusion reaches   the cord at C4-C5.    Multilevel uncinate hypertrophy. C4-C5 moderate bilateral neural   foraminal narrowing, C5-C6 mild to moderate left neural foraminal   narrowing, C6-C7 moderate left neural foraminal narrowing.    < from: Xray Chest 1 View- PORTABLE-Routine (09.09.19 @ 18:12) >  Clear lungs. No pleural effusion or pneumothorax.    The cardiomediastinal silhouette is normal in size and contour.    MEDICATIONS  (STANDING):  atorvastatin 20 milliGRAM(s) Oral at bedtime  dexamethasone     Tablet 4 milliGRAM(s) Oral three times a day  docusate sodium 100 milliGRAM(s) Oral three times a day  enoxaparin Injectable 40 milliGRAM(s) SubCutaneous at bedtime  gabapentin 300 milliGRAM(s) Oral three times a day  influenza   Vaccine 0.5 milliLiter(s) IntraMuscular once  insulin lispro (HumaLOG) corrective regimen sliding scale   SubCutaneous Before meals and at bedtime  losartan 25 milliGRAM(s) Oral daily    MEDICATIONS  (PRN):  acetaminophen   Tablet .. 650 milliGRAM(s) Oral every 6 hours PRN Mild Pain (1 - 3)  bisacodyl 5 milliGRAM(s) Oral daily PRN Constipation  oxyCODONE    IR 5 milliGRAM(s) Oral every 4 hours PRN Moderate Pain (4 - 6)  oxyCODONE    IR 10 milliGRAM(s) Oral every 4 hours PRN Severe Pain (7 - 10)  senna 2 Tablet(s) Oral at bedtime PRN Constipation SUBJECTIVE: Patient seen and evaluated at bedside. No overnight events. Patient states that he feels that his strength and numbnes has improved. He denies any pain or discomfort. He denies chest pain, shortness of breath or abdominal pain.     Vital Signs Last 24 Hrs  T(C): 37 (12 Sep 2019 13:33), Max: 37 (11 Sep 2019 17:05)  T(F): 98.6 (12 Sep 2019 13:33), Max: 98.6 (11 Sep 2019 17:05)  HR: 80 (12 Sep 2019 13:33) (70 - 80)  BP: 139/79 (12 Sep 2019 13:33) (136/76 - 155/91)  BP(mean): --  RR: 18 (12 Sep 2019 13:33) (18 - 18)  SpO2: 95% (12 Sep 2019 13:33) (95% - 98%)  DIET: [X ] Regular [ ] CCD [ ] Renal [ ] Puree [ ] Dysphagia [ ] Tube Feeds:     DRAINS: NA    PHYSICAL EXAM:    Constitutional: No Acute Distress     Neurological: No interval change. AOx3, Following Commands, Moving all Extremities     Motor exam:          Upper extremity                         Delt     Bicep     Tricep    HG                                                 R         5/5        5/5        5/5       4/5                                               L          5/5        5/5        5/5       4-/5          Lower extremity                        HF         KF        KE       DF         PF                                                  R        5/5        5/5       5/5      5/5         5/5                                               L         5/5        5/5      5/5      5/5          5/5                                                 Sensation: [X] intact to light touch  [] decreased:     Pulmonary: Clear to Auscultation, No rales, No rhonchi, No wheezes     Cardiovascular: S1, S2, Regular rate and rhythm     Gastrointestinal: Soft, Non-tender, Non-distended     Extremities: No calf tenderness     Incision: NA    LABS:                        15.1   7.6   )-----------( 160      ( 08 Sep 2019 20:18 )             44.2    09-08    140  |  103  |  13  ----------------------------<  108<H>  4.1   |  25  |  1.17    Ca    10.1      08 Sep 2019 20:18    TPro  7.6  /  Alb  4.7  /  TBili  0.4  /  DBili  x   /  AST  31  /  ALT  22  /  AlkPhos  112  09-08  PT/INR - ( 08 Sep 2019 20:18 )   PT: 11.7 sec;   INR: 1.02 ratio    PTT - ( 08 Sep 2019 20:18 )  PTT:32.5 sec    IMAGING:  < from: MR Acute Spinal Cord Compression (09.08.19 @ 23:39) >  IMPRESSION: No acute fractures or dislocations. Disc osteophyte complex   C3-4 with mild cord compression and possible cord signal change. Small   right-sided disc herniation T8-9 without cord compression. L5-S1 disc   herniation and degenerative disc disease with moderate thecal sac   compression.    < from: CT Thoracic Spine No Cont (09.08.19 @ 20:33) >  1. No acute displaced fracture.  2. Degenerative changes within the cervical spine with significant   multilevel stenosis. Nonurgent MRI is recommended for further   characterization.    < from: MR Cervical Spine No Cont (09.10.19 @ 17:41) >  Multilevel degenerative changes resulting in martha spinal cord   compression at C3-C4. Abnormal intrinsic cord signal at this level may   represent cord edema.    Cord impingement at C5-C6 and C6-C7. Broad-based disc protrusion reaches   the cord at C4-C5.    Multilevel uncinate hypertrophy. C4-C5 moderate bilateral neural   foraminal narrowing, C5-C6 mild to moderate left neural foraminal   narrowing, C6-C7 moderate left neural foraminal narrowing.    < from: Xray Chest 1 View- PORTABLE-Routine (09.09.19 @ 18:12) >  Clear lungs. No pleural effusion or pneumothorax.    The cardiomediastinal silhouette is normal in size and contour.    MEDICATIONS  (STANDING):  atorvastatin 20 milliGRAM(s) Oral at bedtime  dexamethasone     Tablet 4 milliGRAM(s) Oral three times a day  docusate sodium 100 milliGRAM(s) Oral three times a day  enoxaparin Injectable 40 milliGRAM(s) SubCutaneous at bedtime  gabapentin 300 milliGRAM(s) Oral three times a day  influenza   Vaccine 0.5 milliLiter(s) IntraMuscular once  insulin lispro (HumaLOG) corrective regimen sliding scale   SubCutaneous Before meals and at bedtime  losartan 25 milliGRAM(s) Oral daily    MEDICATIONS  (PRN):  acetaminophen   Tablet .. 650 milliGRAM(s) Oral every 6 hours PRN Mild Pain (1 - 3)  bisacodyl 5 milliGRAM(s) Oral daily PRN Constipation  oxyCODONE    IR 5 milliGRAM(s) Oral every 4 hours PRN Moderate Pain (4 - 6)  oxyCODONE    IR 10 milliGRAM(s) Oral every 4 hours PRN Severe Pain (7 - 10)  senna 2 Tablet(s) Oral at bedtime PRN Constipation

## 2019-09-12 NOTE — DISCHARGE NOTE PROVIDER - HOSPITAL COURSE
Patient is a 59 year old male with past medical history of Hypertension, Hyperlipidemia, who presented with neck pain and bilateral arm burning tingling pain/ weakness after a mechanical  fall an hour before presentation . Patient stated he slipped and fell hitting upper back on the bathtub. He denies  any loss of conciseness or head injury. Denies headache, dizziness or visual changes. Denies nausea, vomiting, chest pain, shortness of breath or abdominal pain. Upon MRI of cervical spine patient was found to have spinal cord compression on C3-C4 with cord edema, and stenosis through C5-C7. Patient is scheduled for a C3-C6 Laminectomy with C3-T1 fusion on Tuesday Sept 17. Patient was seen and evaluated at bedside today with Dr. Watkins, and is determined to be medically stable for discharge today. Patient will be discharged to home with home PT per physical therapy recommendations.

## 2019-09-12 NOTE — DISCHARGE NOTE PROVIDER - REASON FOR ADMISSION
s/p fall with neck pain found to have cervical stenosis s/p fall with neck pain, w b/l numbness and distal weakness

## 2019-09-12 NOTE — DISCHARGE NOTE PROVIDER - CARE PROVIDER_API CALL
Colt Winslow)  Neurological Surgery  [address] [place] [address], [address] [place] [address]  Phone: [phone]  Fax: [phone]  Follow Up Time:

## 2019-09-12 NOTE — PROGRESS NOTE ADULT - SUBJECTIVE AND OBJECTIVE BOX
No new complaints.  NS planning surgery next week  MRI shows multiple areas of compression      Vital Signs Last 24 Hrs  T(C): 36.7 (12 Sep 2019 05:12), Max: 37 (11 Sep 2019 17:05)  T(F): 98.1 (12 Sep 2019 05:12), Max: 98.6 (11 Sep 2019 17:05)  HR: 72 (12 Sep 2019 05:12) (72 - 78)  BP: 143/79 (12 Sep 2019 05:12) (136/76 - 155/91)  BP(mean): --  RR: 18 (12 Sep 2019 05:12) (18 - 18)  SpO2: 95% (12 Sep 2019 05:12) (93% - 98%)  Daily     Daily Weight in k.7 (11 Sep 2019 13:22)     @ 07:01  -   @ 07:00  --------------------------------------------------------  IN: 1370 mL / OUT: 0 mL / NET: 1370 mL    PHYSICAL EXAM:  HEAD:  Atraumatic, Normocephalic  NECK: Supple, No   JVD  CHEST/LUNG:   no     rales,     no,    rhonchi  HEART: Regular rate and rhythm;         murmur  ABDOMEN: Soft, Nontender, ;   EXTREMITIES:     no   edema  NEUROLOGY:  alert  right  hand weakness/ able to raise right arm. worse L arm weakness  Nl LE strength B/L                            MEDICATIONS  (STANDING):  atorvastatin 20 milliGRAM(s) Oral at bedtime  dexamethasone     Tablet 4 milliGRAM(s) Oral three times a day  docusate sodium 100 milliGRAM(s) Oral three times a day  enoxaparin Injectable 40 milliGRAM(s) SubCutaneous at bedtime  gabapentin 300 milliGRAM(s) Oral three times a day  influenza   Vaccine 0.5 milliLiter(s) IntraMuscular once  insulin lispro (HumaLOG) corrective regimen sliding scale   SubCutaneous Before meals and at bedtime  losartan 25 milliGRAM(s) Oral daily    MEDICATIONS  (PRN):  acetaminophen   Tablet .. 650 milliGRAM(s) Oral every 6 hours PRN Mild Pain (1 - 3)  bisacodyl 5 milliGRAM(s) Oral daily PRN Constipation  oxyCODONE    IR 5 milliGRAM(s) Oral every 4 hours PRN Moderate Pain (4 - 6)  oxyCODONE    IR 10 milliGRAM(s) Oral every 4 hours PRN Severe Pain (7 - 10)  senna 2 Tablet(s) Oral at bedtime PRN Constipation    Impression/ Plan    59 M   with h/o HTN. HLD     S/p fall and hit his neck, now with b/l UE tingling and numbness.   CT C and T spine negative for any acute injuries.  MRI shows cervical cord compression as above   L UE proximal 4+/5, HG 4-/5 L weaker than right, tingling numbness down both arms,. Otherwise neurologically intact, pe r neuro surgery  For decompressive surgery .  Pt is medically cleared for surgery      Trell Diop MD  [phone]

## 2019-09-12 NOTE — PROGRESS NOTE ADULT - REASON FOR ADMISSION
s/p fall with neck pain, w b/l numbness and distal weakness
s/p fall with neck pain with subsequent bilateral handgrip weakness numbness.
s/p fall with neck pain, w b/l numbness and distal weakness

## 2019-09-12 NOTE — DISCHARGE NOTE NURSING/CASE MANAGEMENT/SOCIAL WORK - PATIENT PORTAL LINK FT
You can access the FollowMyHealth Patient Portal offered by [facility] by registering at the following website: [email or web address]. By joining [facility]’s FollowMyHealth portal, you will also be able to view your health information using other applications (apps) compatible with our system.

## 2019-09-12 NOTE — PROGRESS NOTE ADULT - ATTENDING COMMENTS
see H&P
Pt examined this afternoon, was feeling better.  Has less pain and better movement on decadron and gabapentin.  He is ambulating, MORENO.  Motor is now 5/5  strength, biceps, triceps, wrist extension and flexion.  5/5 LE.  New MRI reinforced C34 as worse compression with C45, C56, C67 with compressive pathology as well.  Pt would benefit from cervical laminectomy and instrumentation/fusion with likely selective foraminotomies where FS is significant.  Discussed with pt, wife, mother, and sister(on phone).  Discussed surgery.  Risks include, but not limited to bleeding, infection, instrument malpositioning, need for future surgery, adjacent segment problems, reduced ROM, spasm, failure of instrumentation, weakness, paralysis.  Pt desires to proceed and surgery is scheduled for Tuesday 9/17.  Postoperative expected course was discussed.
Pt seen earlier today.  He was feeling better on the decadron and gabapentin.  He is ambulating with good strength.  Plan for d/c today, PST tomorrow at 10 AM, surgery next Tuesday for laminectomies/fusion.

## 2019-09-12 NOTE — DISCHARGE NOTE PROVIDER - NSDCFUSCHEDAPPT_GEN_ALL_CORE_FT
DANIEL MUHAMMAD ; [facility]16/2019 ; [facility]  DANI[facility] MUHAMMAD ; 09/17/2019 ; [facility]urg [address] DANIEL Reynoso ; 09/17/2019 ; [facility] PreAdmits DANIEL MUHAMMAD ; 09/16/2019 ; [facility]  DANIEL MUHAMMAD ; 09/17/2019 ; [facility] NeuroSurg [address] DANIEL Reynoso ; 09/17/2019 ; [facility] PreAdmits DANIEL MUHAMMAD ; 09/16/2019 ; [facility]  DANIEL MUHAMMAD ; 09/17/2019 ; NPP NeuroSurg [address] DANIEL Reynoso ; 09/17/2019 ; [facility] PreAdmits DANIEL MUHAMMAD ; 09[facility]16/2019 [facility]  DANIEL MUHAMMAD ; 09/17/2019 ; NPP NeuroSurg [address] DANIEL Reynoso ; 09/17/2019 ; [facility] PreAdmits

## 2019-09-12 NOTE — PROGRESS NOTE ADULT - ASSESSMENT
Patient is a 59 year old male with PMHx, of HTN, HLD, who presented with neck/ upper back pain and bilateral arm burning tingling pain/ weakness after mechanical  fall an hour before presentation . Patient stated he slipped and fell hitting upper back on the bathtub. He denies  any loss of conciseness or head injury. Denies headache, dizziness or visual changes. Denies nausea, vomiting, chest pain, shortness of breath or abdominal pain. Upon MRI of cervical spine patient was found to have spinal cord compression on C3-C4 with cord edema, and stenosis through C5-C7. Patient is scheduled for a C3-C6 Laminectomy with C3-T1 fusion on Tuesday Sept 17.       PLAN:  Neuro: [place]-J on at all times. Cont Decadron taper, decadron will be tapered today to Dec 2mg Q 12 and will taper to Dec 1 mg BID on Sunday.  Surgery on Tuesday 9/17 with Dr. Watkins. Continue pain management with Oxy IR, Tylenol, and Gabapentin,     Respiratory: Patient instructed to use incentive spirometer    CV: Continue atorvastatin for hyperlipidemia.     GI: Continue Regular diet. Continue Senna and add colace for bowel regimen.     Endocrine: Continue HISS and taper down decadron for better glucose control               DVT ppx: [X ] SQL [ ] SQH and Venodynes [ ] Left [ ] Right [ X] Bilateral    PT/ OT: Seen and evaluated. Patient cleared for outpatient PT.     Case discussed with Dr. Watkins     Assessment:  Please Check When Present   []  GCS  E   V  M     Heart Failure: []Acute, [] acute on chronic , []chronic  Heart Failure:  [] Diastolic (HFpEF), [] Systolic (HFrEF), []Combined (HFpEF and HFrEF), [] RHF, [] Pulm HTN, [] Other    [] ELEONORA, [] ATN, [] AIN, [] other  [] CKD1, [] CKD2, [] CKD 3, [] CKD 4, [] CKD 5, []ESRD    Encephalopathy: [] Metabolic, [] Hepatic, [] toxic, [] Neurological, [] Other    Abnormal Nurtitional Status: [] malnurtition (see nutrition note), [ ]underweight: BMI < 19, [] morbid obesity: BMI >40, [] Cachexia    [] Sepsis  [] hypovolemic shock,[] cardiogenic shock, [] hemorrhagic shock, [] neuogenic shock  [] Acute Respiratory Failure  []Cerebral edema, [] Brain compression/ herniation,   [] Functional quadriplegia  [] Acute blood loss anemia

## 2019-09-13 ENCOUNTER — OUTPATIENT (OUTPATIENT)
Dept: OUTPATIENT SERVICES | Facility: HOSPITAL | Age: 59
LOS: 1 days | End: 2019-09-13
Payer: COMMERCIAL

## 2019-09-13 VITALS
WEIGHT: 181 LBS | HEIGHT: 71 IN | HEART RATE: 58 BPM | OXYGEN SATURATION: 96 % | SYSTOLIC BLOOD PRESSURE: 151 MMHG | DIASTOLIC BLOOD PRESSURE: 91 MMHG | RESPIRATION RATE: 14 BRPM | TEMPERATURE: 97 F

## 2019-09-13 DIAGNOSIS — M48.02 SPINAL STENOSIS, CERVICAL REGION: ICD-10-CM

## 2019-09-13 DIAGNOSIS — Z29.9 ENCOUNTER FOR PROPHYLACTIC MEASURES, UNSPECIFIED: ICD-10-CM

## 2019-09-13 DIAGNOSIS — M47.12 OTHER SPONDYLOSIS WITH MYELOPATHY, CERVICAL REGION: ICD-10-CM

## 2019-09-13 DIAGNOSIS — I10 ESSENTIAL (PRIMARY) HYPERTENSION: ICD-10-CM

## 2019-09-13 DIAGNOSIS — Z01.818 ENCOUNTER FOR OTHER PREPROCEDURAL EXAMINATION: ICD-10-CM

## 2019-09-13 LAB
BLD GP AB SCN SERPL QL: NEGATIVE — SIGNIFICANT CHANGE UP
MRSA PCR RESULT.: SIGNIFICANT CHANGE UP
RH IG SCN BLD-IMP: POSITIVE — SIGNIFICANT CHANGE UP
S AUREUS DNA NOSE QL NAA+PROBE: SIGNIFICANT CHANGE UP

## 2019-09-13 PROCEDURE — 86900 BLOOD TYPING SEROLOGIC ABO: CPT

## 2019-09-13 PROCEDURE — 86850 RBC ANTIBODY SCREEN: CPT

## 2019-09-13 PROCEDURE — 87640 STAPH A DNA AMP PROBE: CPT

## 2019-09-13 PROCEDURE — 87641 MR-STAPH DNA AMP PROBE: CPT

## 2019-09-13 PROCEDURE — 86901 BLOOD TYPING SEROLOGIC RH(D): CPT

## 2019-09-13 PROCEDURE — G0463: CPT

## 2019-09-13 NOTE — H&P PST ADULT - NSICDXPROBLEM_GEN_ALL_CORE_FT
PROBLEM DIAGNOSES  Problem: Spinal stenosis, cervical region  Assessment and Plan: Scheduled C3-C6 lami   nasal swab collected    Problem: Essential hypertension  Assessment and Plan: c/w Exforge; will take the morning of surgery    Problem: Need for prophylactic measure  Assessment and Plan: The Caprini score indicates this patient is at risk for a VTE event (score 3-5).  Most surgical patients in this group would benefit from pharmacologic prophylaxis.  The surgical team will determine the balance between VTE risk and bleeding risk

## 2019-09-13 NOTE — H&P PST ADULT - ATTENDING COMMENTS
Pt is currently neuro intact. Pt with cervical stenosis most prominent at C34 with multilevel disease and compressive pathology.  Discussed with pt yesterday alternative of 4-level anterior discectomies, and rev discussed laminectomy.  Pt has elected to undergo ACDFI.  R/B/A discussed in detail in office.  Pt ok to proceed.

## 2019-09-13 NOTE — H&P PST ADULT - HISTORY OF PRESENT ILLNESS
58yo male pt with PMHx, of HTN, HLD, ambulatory c/o neck/ upper back pain and B/L arm burning tingling pain/ weakness s/p fall one hour ago. Pt stated he slipped and fell hitting upper back on the bathtub. Denies LOC or head injury. Denies headache, dizziness or visual changes. Denies N/V. Denies CP/SOB/ABD pain. Denies pelvic or hip pain. Denies fever, chills or recent sickness. 60 y/o male with PMHx of HTN, HLD, s/p slip and fall injuring his neck and upper back. Pt states he is now experiencing bilateral UE weakness and mild tingling sensation to his left hand. MRI showing   Presents for C3-C6 laminectomy. 60 y/o male with pmhx of HTN, HLD, s/p slip and fall injuring his neck and upper back. Pt states he is now experiencing bilateral UE weakness and mild tingling sensation to his left hand. MRI showing multilevel degenerative changes and cord impingement. Presents for C3-C6 laminectomy.

## 2019-09-13 NOTE — H&P PST ADULT - NSICDXPASTMEDICALHX_GEN_ALL_CORE_FT
PAST MEDICAL HISTORY:  Dyslipidemia     Essential hypertension PAST MEDICAL HISTORY:  Dyslipidemia     Essential hypertension     Spinal stenosis, cervical region

## 2019-09-13 NOTE — H&P PST ADULT - ASSESSMENT
CAPRINI SCORE [CLOT updated 18]    AGE RELATED RISK FACTORS                                                       MOBILITY RELATED FACTORS  [ ] Age 41-60 years                                            (1 Point)                    [ ] Bed rest                                                        (1 Point)  [ ] Age: 61-74 years                                           (2 Points)                  [ ] Plaster cast                                                   (2 Points)  [ ] Age= 75 years                                              (3 Points)                    [ ] Bed bound for more than 72 hours                 (2 Points)    DISEASE RELATED RISK FACTORS                                               GENDER SPECIFIC FACTORS  [ ] Edema in the lower extremities                       (1 Point)              [ ] Pregnancy                                                     (1 Point)  [ ] Varicose veins                                               (1 Point)                     [ ] Post-partum < 6 weeks                                   (1 Point)             [ ] BMI > 25 Kg/m2                                            (1 Point)                     [ ] Hormonal therapy  or oral contraception          (1 Point)                 [ ] Sepsis (in the previous month)                        (1 Point)               [ ] History of pregnancy complications                 (1 point)  [ ] Pneumonia or serious lung disease                                               [ ] Unexplained or recurrent                     (1 Point)           (in the previous month)                               (1 Point)  [ ] Abnormal pulmonary function test                     (1 Point)                 SURGERY RELATED RISK FACTORS  [ ] Acute myocardial infarction                              (1 Point)               [ ]  Section                                             (1 Point)  [ ] Congestive heart failure (in the previous month)  (1 Point)      [ ] Minor surgery                                                  (1 Point)   [ ] Inflammatory bowel disease                             (1 Point)               [ ] Arthroscopic surgery                                        (2 Points)  [ ] Central venous access                                      (2 Points)                [ ] General surgery lasting more than 45 minutes (2 points)  [ ] Present or previous malignancy                     (2 Points)                [ ] Elective arthroplasty                                         (5 points)    [ ] Stroke (in the previous month)                          (5 Points)                                                                                                                                                           HEMATOLOGY RELATED FACTORS                                                 TRAUMA RELATED RISK FACTORS  [ ] Prior episodes of VTE                                     (3 Points)                [ ] Fracture of the hip, pelvis, or leg                       (5 Points)  [ ] Positive family history for VTE                         (3 Points)             [ ] Acute spinal cord injury (in the previous month)  (5 Points)  [ ] Prothrombin 26036 A                                     (3 Points)               [ ] Paralysis  (less than 1 month)                             (5 Points)  [ ] Factor V Leiden                                             (3 Points)                  [ ] Multiple Trauma within 1 month                        (5 Points)  [ ] Lupus anticoagulants                                     (3 Points)                                                           [ ] Anticardiolipin antibodies                               (3 Points)                                                       [ ] High homocysteine in the blood                      (3 Points)                                             [ ] Other congenital or acquired thrombophilia      (3 Points)                                                [ ] Heparin induced thrombocytopenia                  (3 Points)                                     Total Score [     3     ]

## 2019-09-16 ENCOUNTER — TRANSCRIPTION ENCOUNTER (OUTPATIENT)
Age: 59
End: 2019-09-16

## 2019-09-16 ENCOUNTER — APPOINTMENT (OUTPATIENT)
Dept: NEUROSURGERY | Facility: CLINIC | Age: 59
End: 2019-09-16
Payer: MEDICAID

## 2019-09-16 ENCOUNTER — OUTPATIENT (OUTPATIENT)
Dept: OUTPATIENT SERVICES | Facility: HOSPITAL | Age: 59
LOS: 1 days | End: 2019-09-16
Payer: COMMERCIAL

## 2019-09-16 VITALS
WEIGHT: 180 LBS | DIASTOLIC BLOOD PRESSURE: 80 MMHG | SYSTOLIC BLOOD PRESSURE: 123 MMHG | HEART RATE: 71 BPM | OXYGEN SATURATION: 95 % | TEMPERATURE: 98.6 F | RESPIRATION RATE: 18 BRPM | HEIGHT: 71 IN | BODY MASS INDEX: 25.2 KG/M2

## 2019-09-16 DIAGNOSIS — M47.12 OTHER SPONDYLOSIS WITH MYELOPATHY, CERVICAL REGION: ICD-10-CM

## 2019-09-16 PROBLEM — M48.02 SPINAL STENOSIS, CERVICAL REGION: Chronic | Status: ACTIVE | Noted: 2019-09-13

## 2019-09-16 PROCEDURE — 99214 OFFICE O/P EST MOD 30 MIN: CPT | Mod: 57

## 2019-09-16 PROCEDURE — 93880 EXTRACRANIAL BILAT STUDY: CPT

## 2019-09-16 PROCEDURE — 93880 EXTRACRANIAL BILAT STUDY: CPT | Mod: 26

## 2019-09-17 ENCOUNTER — RESULT REVIEW (OUTPATIENT)
Age: 59
End: 2019-09-17

## 2019-09-17 ENCOUNTER — INPATIENT (INPATIENT)
Facility: HOSPITAL | Age: 59
LOS: 2 days | Discharge: HOME | DRG: 472 | End: 2019-09-20
Attending: NEUROLOGICAL SURGERY | Admitting: NEUROLOGICAL SURGERY
Payer: COMMERCIAL

## 2019-09-17 ENCOUNTER — APPOINTMENT (OUTPATIENT)
Dept: NEUROSURGERY | Facility: CLINIC | Age: 59
End: 2019-09-17

## 2019-09-17 VITALS
HEART RATE: 71 BPM | DIASTOLIC BLOOD PRESSURE: 80 MMHG | TEMPERATURE: 98 F | OXYGEN SATURATION: 96 % | HEIGHT: 71 IN | WEIGHT: 179.9 LBS | RESPIRATION RATE: 16 BRPM | SYSTOLIC BLOOD PRESSURE: 137 MMHG

## 2019-09-17 DIAGNOSIS — M50.20 OTHER CERVICAL DISC DISPLACEMENT, UNSPECIFIED CERVICAL REGION: ICD-10-CM

## 2019-09-17 DIAGNOSIS — M47.12 OTHER SPONDYLOSIS WITH MYELOPATHY, CERVICAL REGION: ICD-10-CM

## 2019-09-17 DIAGNOSIS — K57.30 DIVERTICULOSIS OF LARGE INTESTINE WITHOUT PERFORATION OR ABSCESS WITHOUT BLEEDING: ICD-10-CM

## 2019-09-17 LAB
ANION GAP SERPL CALC-SCNC: 15 MMOL/L — SIGNIFICANT CHANGE UP (ref 5–17)
BASOPHILS # BLD AUTO: 0 K/UL — SIGNIFICANT CHANGE UP (ref 0–0.2)
BUN SERPL-MCNC: 15 MG/DL — SIGNIFICANT CHANGE UP (ref 7–23)
CALCIUM SERPL-MCNC: 8.7 MG/DL — SIGNIFICANT CHANGE UP (ref 8.4–10.5)
CHLORIDE SERPL-SCNC: 111 MMOL/L — HIGH (ref 96–108)
CO2 SERPL-SCNC: 23 MMOL/L — SIGNIFICANT CHANGE UP (ref 22–31)
CREAT SERPL-MCNC: 1.01 MG/DL — SIGNIFICANT CHANGE UP (ref 0.5–1.3)
EOSINOPHIL # BLD AUTO: 0 K/UL — SIGNIFICANT CHANGE UP (ref 0–0.5)
GLUCOSE SERPL-MCNC: 199 MG/DL — HIGH (ref 70–99)
HCT VFR BLD CALC: 43.5 % — SIGNIFICANT CHANGE UP (ref 39–50)
HGB BLD-MCNC: 14.5 G/DL — SIGNIFICANT CHANGE UP (ref 13–17)
LYMPHOCYTES # BLD AUTO: 0.7 K/UL — LOW (ref 1–3.3)
LYMPHOCYTES # BLD AUTO: 3 % — LOW (ref 13–44)
MCHC RBC-ENTMCNC: 27.6 PG — SIGNIFICANT CHANGE UP (ref 27–34)
MCHC RBC-ENTMCNC: 33.3 GM/DL — SIGNIFICANT CHANGE UP (ref 32–36)
MCV RBC AUTO: 82.9 FL — SIGNIFICANT CHANGE UP (ref 80–100)
MONOCYTES # BLD AUTO: 0.5 K/UL — SIGNIFICANT CHANGE UP (ref 0–0.9)
MONOCYTES NFR BLD AUTO: 4 % — SIGNIFICANT CHANGE UP (ref 2–14)
NEUTROPHILS # BLD AUTO: 14.2 K/UL — HIGH (ref 1.8–7.4)
NEUTROPHILS NFR BLD AUTO: 92 % — HIGH (ref 43–77)
NEUTS BAND # BLD: 1 % — SIGNIFICANT CHANGE UP (ref 0–8)
PLAT MORPH BLD: NORMAL — SIGNIFICANT CHANGE UP
PLATELET # BLD AUTO: 173 K/UL — SIGNIFICANT CHANGE UP (ref 150–400)
POTASSIUM SERPL-MCNC: 4.4 MMOL/L — SIGNIFICANT CHANGE UP (ref 3.5–5.3)
POTASSIUM SERPL-SCNC: 4.4 MMOL/L — SIGNIFICANT CHANGE UP (ref 3.5–5.3)
RBC # BLD: 5.24 M/UL — SIGNIFICANT CHANGE UP (ref 4.2–5.8)
RBC # FLD: 14.4 % — SIGNIFICANT CHANGE UP (ref 10.3–14.5)
RBC BLD AUTO: SIGNIFICANT CHANGE UP
SODIUM SERPL-SCNC: 149 MMOL/L — HIGH (ref 135–145)
WBC # BLD: 15.4 K/UL — HIGH (ref 3.8–10.5)
WBC # FLD AUTO: 15.4 K/UL — HIGH (ref 3.8–10.5)

## 2019-09-17 PROCEDURE — 88311 DECALCIFY TISSUE: CPT | Mod: 26

## 2019-09-17 PROCEDURE — 72125 CT NECK SPINE W/O DYE: CPT | Mod: 26

## 2019-09-17 PROCEDURE — 22853 INSJ BIOMECHANICAL DEVICE: CPT

## 2019-09-17 PROCEDURE — 22551 ARTHRD ANT NTRBDY CERVICAL: CPT

## 2019-09-17 PROCEDURE — 88304 TISSUE EXAM BY PATHOLOGIST: CPT | Mod: 26

## 2019-09-17 PROCEDURE — 22552 ARTHRD ANT NTRBD CERVICAL EA: CPT

## 2019-09-17 RX ORDER — SODIUM CHLORIDE 9 MG/ML
3 INJECTION INTRAMUSCULAR; INTRAVENOUS; SUBCUTANEOUS EVERY 8 HOURS
Refills: 0 | Status: DISCONTINUED | OUTPATIENT
Start: 2019-09-17 | End: 2019-09-17

## 2019-09-17 RX ORDER — ONDANSETRON 8 MG/1
4 TABLET, FILM COATED ORAL ONCE
Refills: 0 | Status: DISCONTINUED | OUTPATIENT
Start: 2019-09-17 | End: 2019-09-18

## 2019-09-17 RX ORDER — CHLORHEXIDINE GLUCONATE 213 G/1000ML
1 SOLUTION TOPICAL ONCE
Refills: 0 | Status: DISCONTINUED | OUTPATIENT
Start: 2019-09-17 | End: 2019-09-17

## 2019-09-17 RX ORDER — AMLODIPINE BESYLATE 2.5 MG/1
5 TABLET ORAL DAILY
Refills: 0 | Status: DISCONTINUED | OUTPATIENT
Start: 2019-09-17 | End: 2019-09-20

## 2019-09-17 RX ORDER — HYDROMORPHONE HYDROCHLORIDE 2 MG/ML
0.5 INJECTION INTRAMUSCULAR; INTRAVENOUS; SUBCUTANEOUS
Refills: 0 | Status: DISCONTINUED | OUTPATIENT
Start: 2019-09-17 | End: 2019-09-18

## 2019-09-17 RX ORDER — ENOXAPARIN SODIUM 100 MG/ML
40 INJECTION SUBCUTANEOUS DAILY
Refills: 0 | Status: DISCONTINUED | OUTPATIENT
Start: 2019-09-18 | End: 2019-09-20

## 2019-09-17 RX ORDER — DOCUSATE SODIUM 100 MG
100 CAPSULE ORAL THREE TIMES A DAY
Refills: 0 | Status: DISCONTINUED | OUTPATIENT
Start: 2019-09-17 | End: 2019-09-20

## 2019-09-17 RX ORDER — SIMVASTATIN 20 MG/1
20 TABLET, FILM COATED ORAL AT BEDTIME
Refills: 0 | Status: DISCONTINUED | OUTPATIENT
Start: 2019-09-17 | End: 2019-09-20

## 2019-09-17 RX ORDER — ACETAMINOPHEN 500 MG
1000 TABLET ORAL ONCE
Refills: 0 | Status: COMPLETED | OUTPATIENT
Start: 2019-09-17 | End: 2019-09-17

## 2019-09-17 RX ORDER — INFLUENZA VIRUS VACCINE 15; 15; 15; 15 UG/.5ML; UG/.5ML; UG/.5ML; UG/.5ML
0.5 SUSPENSION INTRAMUSCULAR ONCE
Refills: 0 | Status: COMPLETED | OUTPATIENT
Start: 2019-09-17 | End: 2019-09-17

## 2019-09-17 RX ORDER — ACETAMINOPHEN 500 MG
650 TABLET ORAL EVERY 6 HOURS
Refills: 0 | Status: DISCONTINUED | OUTPATIENT
Start: 2019-09-17 | End: 2019-09-20

## 2019-09-17 RX ORDER — DIAZEPAM 5 MG
5 TABLET ORAL EVERY 6 HOURS
Refills: 0 | Status: DISCONTINUED | OUTPATIENT
Start: 2019-09-17 | End: 2019-09-20

## 2019-09-17 RX ORDER — SIMVASTATIN 20 MG/1
1 TABLET, FILM COATED ORAL
Qty: 0 | Refills: 0 | DISCHARGE

## 2019-09-17 RX ORDER — LIDOCAINE HCL 20 MG/ML
0.2 VIAL (ML) INJECTION ONCE
Refills: 0 | Status: DISCONTINUED | OUTPATIENT
Start: 2019-09-17 | End: 2019-09-17

## 2019-09-17 RX ORDER — PANTOPRAZOLE SODIUM 20 MG/1
40 TABLET, DELAYED RELEASE ORAL
Refills: 0 | Status: DISCONTINUED | OUTPATIENT
Start: 2019-09-17 | End: 2019-09-20

## 2019-09-17 RX ORDER — GEMFIBROZIL 600 MG
600 TABLET ORAL ONCE
Refills: 0 | Status: COMPLETED | OUTPATIENT
Start: 2019-09-17 | End: 2019-09-17

## 2019-09-17 RX ORDER — SENNA PLUS 8.6 MG/1
2 TABLET ORAL AT BEDTIME
Refills: 0 | Status: DISCONTINUED | OUTPATIENT
Start: 2019-09-17 | End: 2019-09-20

## 2019-09-17 RX ORDER — CEFAZOLIN SODIUM 1 G
2000 VIAL (EA) INJECTION EVERY 8 HOURS
Refills: 0 | Status: COMPLETED | OUTPATIENT
Start: 2019-09-17 | End: 2019-09-18

## 2019-09-17 RX ORDER — OXYCODONE HYDROCHLORIDE 5 MG/1
10 TABLET ORAL EVERY 6 HOURS
Refills: 0 | Status: DISCONTINUED | OUTPATIENT
Start: 2019-09-17 | End: 2019-09-20

## 2019-09-17 RX ORDER — GEMFIBROZIL 600 MG
1 TABLET ORAL
Qty: 0 | Refills: 0 | DISCHARGE

## 2019-09-17 RX ORDER — FAMOTIDINE 10 MG/ML
20 INJECTION INTRAVENOUS EVERY 12 HOURS
Refills: 0 | Status: DISCONTINUED | OUTPATIENT
Start: 2019-09-17 | End: 2019-09-20

## 2019-09-17 RX ORDER — AMLODIPINE AND VALSARTAN 5; 320 MG/1; MG/1
1 TABLET, FILM COATED ORAL
Qty: 0 | Refills: 0 | DISCHARGE

## 2019-09-17 RX ORDER — DEXTROSE MONOHYDRATE, SODIUM CHLORIDE, AND POTASSIUM CHLORIDE 50; .745; 4.5 G/1000ML; G/1000ML; G/1000ML
1000 INJECTION, SOLUTION INTRAVENOUS
Refills: 0 | Status: DISCONTINUED | OUTPATIENT
Start: 2019-09-17 | End: 2019-09-18

## 2019-09-17 RX ORDER — DEXAMETHASONE 0.5 MG/5ML
4 ELIXIR ORAL EVERY 6 HOURS
Refills: 0 | Status: DISCONTINUED | OUTPATIENT
Start: 2019-09-17 | End: 2019-09-20

## 2019-09-17 RX ORDER — GABAPENTIN 400 MG/1
300 CAPSULE ORAL THREE TIMES A DAY
Refills: 0 | Status: DISCONTINUED | OUTPATIENT
Start: 2019-09-17 | End: 2019-09-20

## 2019-09-17 RX ORDER — INSULIN LISPRO 100/ML
VIAL (ML) SUBCUTANEOUS
Refills: 0 | Status: DISCONTINUED | OUTPATIENT
Start: 2019-09-17 | End: 2019-09-20

## 2019-09-17 RX ORDER — OXYCODONE HYDROCHLORIDE 5 MG/1
5 TABLET ORAL EVERY 6 HOURS
Refills: 0 | Status: DISCONTINUED | OUTPATIENT
Start: 2019-09-17 | End: 2019-09-20

## 2019-09-17 RX ADMIN — HYDROMORPHONE HYDROCHLORIDE 0.5 MILLIGRAM(S): 2 INJECTION INTRAMUSCULAR; INTRAVENOUS; SUBCUTANEOUS at 23:10

## 2019-09-17 RX ADMIN — GABAPENTIN 300 MILLIGRAM(S): 400 CAPSULE ORAL at 22:30

## 2019-09-17 RX ADMIN — Medication 600 MILLIGRAM(S): at 22:31

## 2019-09-17 RX ADMIN — Medication 100 MILLIGRAM(S): at 22:32

## 2019-09-17 RX ADMIN — SIMVASTATIN 20 MILLIGRAM(S): 20 TABLET, FILM COATED ORAL at 22:31

## 2019-09-17 RX ADMIN — Medication 4 MILLIGRAM(S): at 23:24

## 2019-09-17 RX ADMIN — Medication 100 MILLIGRAM(S): at 22:30

## 2019-09-17 RX ADMIN — Medication 4 MILLIGRAM(S): at 20:50

## 2019-09-17 RX ADMIN — HYDROMORPHONE HYDROCHLORIDE 0.5 MILLIGRAM(S): 2 INJECTION INTRAMUSCULAR; INTRAVENOUS; SUBCUTANEOUS at 21:00

## 2019-09-17 RX ADMIN — Medication 1000 MILLIGRAM(S): at 23:01

## 2019-09-17 RX ADMIN — SENNA PLUS 2 TABLET(S): 8.6 TABLET ORAL at 22:30

## 2019-09-17 RX ADMIN — AMLODIPINE BESYLATE 5 MILLIGRAM(S): 2.5 TABLET ORAL at 22:31

## 2019-09-17 RX ADMIN — HYDROMORPHONE HYDROCHLORIDE 0.5 MILLIGRAM(S): 2 INJECTION INTRAMUSCULAR; INTRAVENOUS; SUBCUTANEOUS at 22:55

## 2019-09-17 RX ADMIN — Medication 400 MILLIGRAM(S): at 22:31

## 2019-09-17 RX ADMIN — HYDROMORPHONE HYDROCHLORIDE 0.5 MILLIGRAM(S): 2 INJECTION INTRAMUSCULAR; INTRAVENOUS; SUBCUTANEOUS at 20:45

## 2019-09-17 NOTE — PROGRESS NOTE ADULT - SUBJECTIVE AND OBJECTIVE BOX
Patient seen and examined at bedside.    --Anticoagulation--    T(C): 36 (09-17-19 @ 16:28), Max: 36.9 (09-17-19 @ 07:54)  HR: 82 (09-17-19 @ 17:30) (71 - 87)  BP: 120/68 (09-17-19 @ 17:30) (120/68 - 138/99)  RR: 18 (09-17-19 @ 17:30) (14 - 18)  SpO2: 96% (09-17-19 @ 17:30) (95% - 96%)  Wt(kg): --    Exam:  Exam:  AOx3, Following Commands  Motor:         UE 4/5 proximally, 4-/5 distally, LE 5/5  Sensation / Reflexes  [ x] intact to light touch  [ ] decreased:   No clonus

## 2019-09-17 NOTE — PATIENT PROFILE ADULT - SURGICAL SITE INCISION
Peripheral    Patient location during procedure: pre-op   Block not for primary anesthetic.  Reason for block: at surgeon's request and post-op pain management   Post-op Pain Location: right ankle   Start time: 11/20/2017 7:15 AM  Timeout: 11/20/2017 7:15 AM   End time: 11/20/2017 7:35 AM  Staffing  Anesthesiologist: GREGORY RAUSCH  Performed: anesthesiologist   Preanesthetic Checklist  Completed: patient identified, site marked, surgical consent, pre-op evaluation, timeout performed, IV checked, risks and benefits discussed and monitors and equipment checked  Peripheral Block  Patient position: supine  Prep: ChloraPrep and site prepped and draped  Patient monitoring: heart rate, cardiac monitor, continuous pulse ox, continuous capnometry and frequent blood pressure checks  Block type: popliteal  Laterality: right  Injection technique: continuous  Needle  Needle type: Tuohy   Needle gauge: 18 G  Needle length: 4 in  Needle localization: anatomical landmarks and ultrasound guidance  Catheter type: non-stimulating  Catheter size: 20 G  Test dose: lidocaine 1.5% with Epi 1-to-200,000 and negative   -ultrasound image captured on disc.  Assessment  Injection assessment: negative aspiration, negative parasthesia and local visualized surrounding nerve  Paresthesia pain: none  Heart rate change: no  Slow fractionated injection: yes  Medications:  Bolus administered: 30 mL of 0.5 ropivacaine  Epinephrine added: none  Additional Notes  VSS.  [facility] RN monitoring vitals throughout procedure.  Patient tolerated procedure well.               
Peripheral    Patient location during procedure: pre-op   Block not for primary anesthetic.  Reason for block: at surgeon's request and post-op pain management   Post-op Pain Location: right ankle  Start time: 11/20/2017 7:35 AM  Timeout: 11/20/2017 7:35 AM   End time: 11/20/2017 7:40 AM  Staffing  Anesthesiologist: GREGORY RAUSCH  Performed: anesthesiologist   Preanesthetic Checklist  Completed: patient identified, site marked, surgical consent, pre-op evaluation, timeout performed, IV checked, risks and benefits discussed and monitors and equipment checked  Peripheral Block  Patient position: supine  Prep: ChloraPrep  Patient monitoring: heart rate, cardiac monitor, continuous pulse ox, continuous capnometry and frequent blood pressure checks  Block type: adductor canal  Laterality: right  Injection technique: single shot  Needle  Needle type: Stimuplex   Needle gauge: 21 G  Needle length: 4 in  Needle localization: anatomical landmarks and ultrasound guidance   -ultrasound image captured on disc.  Assessment  Injection assessment: negative aspiration, negative parasthesia and local visualized surrounding nerve  Paresthesia pain: none  Heart rate change: no  Slow fractionated injection: yes  Medications:  Bolus administered: 20 mL of 0.5 ropivacaine  Epinephrine added: none  Additional Notes  VSS.  [facility] RN monitoring vitals throughout procedure.  Patient tolerated procedure well.               
no

## 2019-09-18 DIAGNOSIS — M48.02 SPINAL STENOSIS, CERVICAL REGION: ICD-10-CM

## 2019-09-18 LAB
ANION GAP SERPL CALC-SCNC: 17 MMOL/L — SIGNIFICANT CHANGE UP (ref 5–17)
BASOPHILS # BLD AUTO: 0.02 K/UL — SIGNIFICANT CHANGE UP (ref 0–0.2)
BASOPHILS NFR BLD AUTO: 0.1 % — SIGNIFICANT CHANGE UP (ref 0–2)
BUN SERPL-MCNC: 16 MG/DL — SIGNIFICANT CHANGE UP (ref 7–23)
CALCIUM SERPL-MCNC: 9.2 MG/DL — SIGNIFICANT CHANGE UP (ref 8.4–10.5)
CHLORIDE SERPL-SCNC: 101 MMOL/L — SIGNIFICANT CHANGE UP (ref 96–108)
CO2 SERPL-SCNC: 22 MMOL/L — SIGNIFICANT CHANGE UP (ref 22–31)
CREAT SERPL-MCNC: 0.81 MG/DL — SIGNIFICANT CHANGE UP (ref 0.5–1.3)
EOSINOPHIL # BLD AUTO: 0 K/UL — SIGNIFICANT CHANGE UP (ref 0–0.5)
EOSINOPHIL NFR BLD AUTO: 0 % — SIGNIFICANT CHANGE UP (ref 0–6)
GLUCOSE BLDC GLUCOMTR-MCNC: 137 MG/DL — HIGH (ref 70–99)
GLUCOSE BLDC GLUCOMTR-MCNC: 141 MG/DL — HIGH (ref 70–99)
GLUCOSE BLDC GLUCOMTR-MCNC: 176 MG/DL — HIGH (ref 70–99)
GLUCOSE BLDC GLUCOMTR-MCNC: 178 MG/DL — HIGH (ref 70–99)
GLUCOSE SERPL-MCNC: 180 MG/DL — HIGH (ref 70–99)
HCT VFR BLD CALC: 45.8 % — SIGNIFICANT CHANGE UP (ref 39–50)
HGB BLD-MCNC: 15.1 G/DL — SIGNIFICANT CHANGE UP (ref 13–17)
IMM GRANULOCYTES NFR BLD AUTO: 0.7 % — SIGNIFICANT CHANGE UP (ref 0–1.5)
LYMPHOCYTES # BLD AUTO: 1.15 K/UL — SIGNIFICANT CHANGE UP (ref 1–3.3)
LYMPHOCYTES # BLD AUTO: 6.6 % — LOW (ref 13–44)
MCHC RBC-ENTMCNC: 26.5 PG — LOW (ref 27–34)
MCHC RBC-ENTMCNC: 33 GM/DL — SIGNIFICANT CHANGE UP (ref 32–36)
MCV RBC AUTO: 80.4 FL — SIGNIFICANT CHANGE UP (ref 80–100)
MONOCYTES # BLD AUTO: 0.62 K/UL — SIGNIFICANT CHANGE UP (ref 0–0.9)
MONOCYTES NFR BLD AUTO: 3.5 % — SIGNIFICANT CHANGE UP (ref 2–14)
NEUTROPHILS # BLD AUTO: 15.57 K/UL — HIGH (ref 1.8–7.4)
NEUTROPHILS NFR BLD AUTO: 89.1 % — HIGH (ref 43–77)
PLATELET # BLD AUTO: 179 K/UL — SIGNIFICANT CHANGE UP (ref 150–400)
POTASSIUM SERPL-MCNC: 4.2 MMOL/L — SIGNIFICANT CHANGE UP (ref 3.5–5.3)
POTASSIUM SERPL-SCNC: 4.2 MMOL/L — SIGNIFICANT CHANGE UP (ref 3.5–5.3)
RBC # BLD: 5.7 M/UL — SIGNIFICANT CHANGE UP (ref 4.2–5.8)
RBC # FLD: 15.5 % — HIGH (ref 10.3–14.5)
SODIUM SERPL-SCNC: 140 MMOL/L — SIGNIFICANT CHANGE UP (ref 135–145)
WBC # BLD: 17.49 K/UL — HIGH (ref 3.8–10.5)
WBC # FLD AUTO: 17.49 K/UL — HIGH (ref 3.8–10.5)

## 2019-09-18 PROCEDURE — 72141 MRI NECK SPINE W/O DYE: CPT | Mod: 26

## 2019-09-18 RX ADMIN — Medication 100 MILLIGRAM(S): at 04:56

## 2019-09-18 RX ADMIN — Medication 1: at 13:15

## 2019-09-18 RX ADMIN — GABAPENTIN 300 MILLIGRAM(S): 400 CAPSULE ORAL at 04:55

## 2019-09-18 RX ADMIN — GABAPENTIN 300 MILLIGRAM(S): 400 CAPSULE ORAL at 22:28

## 2019-09-18 RX ADMIN — Medication 4 MILLIGRAM(S): at 08:29

## 2019-09-18 RX ADMIN — Medication 4 MILLIGRAM(S): at 20:20

## 2019-09-18 RX ADMIN — SENNA PLUS 2 TABLET(S): 8.6 TABLET ORAL at 22:28

## 2019-09-18 RX ADMIN — SIMVASTATIN 20 MILLIGRAM(S): 20 TABLET, FILM COATED ORAL at 22:28

## 2019-09-18 RX ADMIN — OXYCODONE HYDROCHLORIDE 10 MILLIGRAM(S): 5 TABLET ORAL at 05:55

## 2019-09-18 RX ADMIN — ENOXAPARIN SODIUM 40 MILLIGRAM(S): 100 INJECTION SUBCUTANEOUS at 13:42

## 2019-09-18 RX ADMIN — OXYCODONE HYDROCHLORIDE 10 MILLIGRAM(S): 5 TABLET ORAL at 04:55

## 2019-09-18 RX ADMIN — Medication 100 MILLIGRAM(S): at 04:55

## 2019-09-18 RX ADMIN — OXYCODONE HYDROCHLORIDE 10 MILLIGRAM(S): 5 TABLET ORAL at 21:30

## 2019-09-18 RX ADMIN — AMLODIPINE BESYLATE 5 MILLIGRAM(S): 2.5 TABLET ORAL at 04:55

## 2019-09-18 RX ADMIN — Medication 100 MILLIGRAM(S): at 13:42

## 2019-09-18 RX ADMIN — Medication 4 MILLIGRAM(S): at 13:42

## 2019-09-18 RX ADMIN — PANTOPRAZOLE SODIUM 40 MILLIGRAM(S): 20 TABLET, DELAYED RELEASE ORAL at 08:30

## 2019-09-18 RX ADMIN — Medication 100 MILLIGRAM(S): at 22:28

## 2019-09-18 RX ADMIN — Medication 1 TABLET(S): at 13:42

## 2019-09-18 RX ADMIN — OXYCODONE HYDROCHLORIDE 10 MILLIGRAM(S): 5 TABLET ORAL at 20:16

## 2019-09-18 RX ADMIN — GABAPENTIN 300 MILLIGRAM(S): 400 CAPSULE ORAL at 13:42

## 2019-09-18 NOTE — PROGRESS NOTE ADULT - ATTENDING COMMENTS
As per above note.  Pt feels good with no pain or swallowing difficulty.  He has weakness, right shoulder.  Pt has 5/5 motor, except for left triceps 5-/5 and right biceps 5-/5, right deltoid 1/5 (has contraction of muscle.  Dressing dry, HV 60 cc.  CT showed good instrumentation and decompression with some residual spondylotic changes.  There is some residual right C5 foraminal stenosis.  Plan to obtain MRI.  Continue Decadron for now and observe.  If no change over next couple of days, may consider posterior decompression.  Discussed with pt and family.  He will ambulate today.  Maintain HV.

## 2019-09-18 NOTE — PHYSICAL THERAPY INITIAL EVALUATION ADULT - MANUAL MUSCLE TESTING RESULTS, REHAB EVAL
Strength is grossly at least 4-/5 throughout. R distal UE 4-/5. shoulder shrug 4-/5. unable to ABD or flex R shoulder/grossly assessed due to

## 2019-09-18 NOTE — PROGRESS NOTE ADULT - SUBJECTIVE AND OBJECTIVE BOX
SUBJECTIVE: Comfortable, NAD. just weak Rt shoulder    OVERNIGHT EVENTS: None    Vital Signs Last 24 Hrs  T(C): 37.1 (18 Sep 2019 08:45), Max: 37.5 (17 Sep 2019 12:45)  T(F): 98.7 (18 Sep 2019 08:45), Max: 99.5 (17 Sep 2019 12:45)  HR: 86 (18 Sep 2019 08:45) (75 - 89)  BP: 158/85 (18 Sep 2019 08:45) (116/66 - 165/87)  BP(mean): 103 (17 Sep 2019 21:00) (82 - 121)  RR: 18 (18 Sep 2019 08:45) (14 - 18)  SpO2: 95% (18 Sep 2019 08:45) (94% - 96%)  IVF: [ ] IVL [X ] NS+K@ 75  DIET: X[ ] Regular [ ] CCD [ ] Renal [ ] Puree [ ] Dysphagia [ ] Tube Feeds:   PCA: [ ] YES [X ] NO   SUBRAMANIAN: [X ] YES-DC 9/18 [ ] NO [ ] VOID   BM: [ ] YES [ ] NO     DRAINS: [ ] ARON (cc/24h) [X ] HMV 60(cc/24h)    PHYSICAL EXAM:    Constitutional: No Acute Distress     Neurological: AOx3, Following Commands, Moving all Extremities     Motor exam:          Upper extremity                         Delt     Bicep     Tricep    HG                                                 R         3/5        4+/5      4+/5       5/5                                               L          5/5        5/5        5/5       5/5          Lower extremity                        HF         KF        KE       DF         PF                                                  R        5/5        5/5        5/5       5/5         5/5                                               L         5/5        5/5       5/5       5/5          5/5                                                 Sensation: [X] intact to light touch  [] decreased:     Pulmonary: Clear to Auscultation, No rales, No rhonchi, No wheezes     Cardiovascular: S1, S2, Regular rate and rhythm     Gastrointestinal: Soft, Non-tender, Non-distended     Extremities: No calf tenderness     Incision: HMV active. CDI/Flat. No dysphagia/dysphonia    LABS:                        15.1   17.49 )-----------( 179      ( 18 Sep 2019 08:25 )             45.8    09-18    140  |  101  |  16  ----------------------------<  180<H>  4.2   |  22  |  0.81    Ca    9.2      18 Sep 2019 06:35      IMAGING:  < from: VA Duplex Carotid, Bilat (09.16.19 @ 13:40) >  Minimal atheromatous intimal thickening and irregularity is present along   the course of the carotid arteries in the neck.    Blood flow velocities are as follows:    RIGHT:    PROX CCA = 71 ;  DIST CCA = 54 ;  PROX ICA = 49 ;  DIST ICA =   47 ;  ECA = 37    LEFT   :    PROX CCA = 83 ;  DIST CCA = 57 ;  PROX ICA = 34 ;  DIST ICA =   39 ;  ECA = 73    There is antegrade flow through both vertebral arteries.    IMPRESSION: No hemodynamically significant carotid artery stenoses.    << from: MR Cervical Spine No Cont (09.10.19 @ 17:41) >  Multilevel degenerative changes resulting in matrha spinal cord   compression at C3-C4. Abnormal intrinsic cord signal at this level may   represent cord edema.    Cord impingement at C5-C6 and C6-C7. Broad-based disc protrusion reaches   the cord at C4-C5.    Multilevel uncinate hypertrophy. C4-C5 moderate bilateral neural   foraminal narrowing, C5-C6 mild to moderate left neural foraminal     MEDICATIONS  (STANDING):  amLODIPine   Tablet 5 milliGRAM(s) Oral daily  ceFAZolin   IVPB 2000 milliGRAM(s) IV Intermittent every 8 hours  dexamethasone     Tablet 4 milliGRAM(s) Oral every 6 hours  docusate sodium 100 milliGRAM(s) Oral three times a day  enoxaparin Injectable 40 milliGRAM(s) SubCutaneous daily  gabapentin 300 milliGRAM(s) Oral three times a day  influenza   Vaccine 0.5 milliLiter(s) IntraMuscular once  insulin lispro (HumaLOG) corrective regimen sliding scale   SubCutaneous three times a day before meals  multivitamin 1 Tablet(s) Oral daily  pantoprazole    Tablet 40 milliGRAM(s) Oral before breakfast  senna 2 Tablet(s) Oral at bedtime  simvastatin 20 milliGRAM(s) Oral at bedtime  sodium chloride 0.9% with potassium chloride 20 mEq/L 1000 milliLiter(s) (75 mL/Hr) IV Continuous <Continuous>    MEDICATIONS  (PRN):  acetaminophen   Tablet .. 650 milliGRAM(s) Oral every 6 hours PRN Temp greater or equal to 38C (100.4F), Mild Pain (1 - 3)  diazepam    Tablet 5 milliGRAM(s) Oral every 6 hours PRN muscle spasm  famotidine    Tablet 20 milliGRAM(s) Oral every 12 hours PRN Dyspepsia  oxyCODONE    IR 5 milliGRAM(s) Oral every 6 hours PRN Moderate Pain (4 - 6)  oxyCODONE    IR 10 milliGRAM(s) Oral every 6 hours PRN Severe Pain (7 - 10)

## 2019-09-18 NOTE — CHART NOTE - NSCHARTNOTEFT_GEN_A_CORE
CAPRINI SCORE [CLOT] Score on Admission for     AGE RELATED RISK FACTORS                                                       MOBILITY RELATED FACTORS  [X ] Age 41-60 years                                            (1 Point)                  [ ] Bed rest                                                        (1 Point)  [ ] Age: 61-74 years                                           (2 Points)                 [ ] Plaster cast                                                   (2 Points)  [ ] Age= 75 years                                              (3 Points)                 [ ] Bed bound for more than 72 hours                 (2 Points)    DISEASE RELATED RISK FACTORS                                               GENDER SPECIFIC FACTORS  [ ] Edema in the lower extremities                       (1 Point)                  [ ] Pregnancy                                                     (1 Point)  [ ] Varicose veins                                               (1 Point)                  [ ] Post-partum < 6 weeks                                   (1 Point)             [ X] BMI > 25 Kg/m2                                            (1 Point)                  [ ] Hormonal therapy  or oral contraception          (1 Point)                 [ ] Sepsis (in the previous month)                        (1 Point)                  [ ] History of pregnancy complications                 (1 point)  [ ] Pneumonia or serious lung disease                                               [ ] Unexplained or recurrent                     (1 Point)           (in the previous month)                               (1 Point)  [ ] Abnormal pulmonary function test                     (1 Point)                 SURGERY RELATED RISK FACTORS (include planned surgeries)  [ ] Acute myocardial infarction                              (1 Point)                 [ ]  Section                                             (1 Point)  [ ] Congestive heart failure (in the previous month)  (1 Point)         [ ] Minor surgery                                                  (1 Point)   [ ] Inflammatory bowel disease                             (1 Point)                 [ ] Arthroscopic surgery                                        (2 Points)  [ ] Central venous access                                      (2 Points)                [X ] General surgery lasting more than 45 minutes   (2 Points)       [ ] Stroke (in the previous month)                          (5 Points)               [ ] Elective arthroplasty                                         (5 Points)            [ ] current or past malignancy                              (2 Points)                                                                                                       HEMATOLOGY RELATED FACTORS                                                 TRAUMA RELATED RISK FACTORS  [ ] Prior episodes of VTE                                     (3 Points)                [ ] Fracture of the hip, pelvis, or leg                       (5 Points)  [ ] Positive family history for VTE                         (3 Points)                 [ ] Acute spinal cord injury (in the previous month)  (5 Points)  [ ] Prothrombin 82033 A                                     (3 Points)                 [ ] Paralysis  (less than 1 month)                             (5 Points)  [ ] Factor V Leiden                                             (3 Points)                  [ ] Multiple Trauma within 1 month                        (5 Points)  [ ] Lupus anticoagulants                                     (3 Points)                                                           [ ] Anticardiolipin antibodies                               (3 Points)                                                       [ ] High homocysteine in the blood                      (3 Points)                                             [ ] Other congenital or acquired thrombophilia      (3 Points)                                                [ ] Heparin induced thrombocytopenia                  (3 Points)                                          Total Score [      4    ]    Risk:  Very low 0   Low 1 to 2   Moderate 3 to 4   High =5       VTE Prophylasix Recommednations:  [ X] mechanical pneumatic compression devices                                      [ ] contraindicated: _____________________  [ X] chemo prophylasix                                                                                   [ ] contraindicated _____________________    **** HIGH LIKELIHOOD DVT PRESENT ON ADMISSION  [ ] (please order LE dopplers within 24 hours of admission)

## 2019-09-18 NOTE — PHYSICAL THERAPY INITIAL EVALUATION ADULT - ADDITIONAL COMMENTS
Pt lives with wife in apartment with about 2 steps to enter with ramp option. Pt indep community amb without AD and indep with all ADLs prior to Sx. Pt works as an .

## 2019-09-18 NOTE — PHYSICAL THERAPY INITIAL EVALUATION ADULT - ACTIVE RANGE OF MOTION EXAMINATION, REHAB EVAL
bilateral lower extremity Active ROM was WNL (within normal limits)/unable to perform R shoulder abd and flex. Distal RUE WNL./LLE Active ROM was WNL (within normal limits)

## 2019-09-18 NOTE — PHYSICAL THERAPY INITIAL EVALUATION ADULT - PRECAUTIONS/LIMITATIONS, REHAB EVAL
fall precautions/Cervical Spine CT 9/17/19: Status post C3 through C7 anterior discectomy and placement of artificial disc spacers extending from C3 to C7, with left-sided surgical drain. Expected postsurgical prevertebral edema and subcutaneous emphysema. Redemonstration of multilevel degenerative change causing foraminal and central narrowing as detailed above. Canal contents are suboptimally seen on CT, MR may be obtained for improved assessment as clinically warranted./spinal precautions

## 2019-09-18 NOTE — PROGRESS NOTE ADULT - ASSESSMENT
PROCEDURE: Anterior cervical diskectomy fusion C3-7     POD#1    PLAN:  Neuro: Cont HMV drain.  DC Sharma 9/18 to TOV. CT C-spine done last PM-report-P FU.  MRI C-spine-P FU. Cont Decadron 4mg Q6h. Leukocytosis from steroids. FU Path[employer] activity/OOB.     Medicine note of 9/12-Pt is medically cleared for surgery. Trell Diop  [phone]    Respiratory: Patient instructed to use incentive spirometer [ X] YES [ ] NO              DVT ppx: [X ] SQL [ ] SQH and Venodynes [ ] Left [ ] Right [ X] Bilateral    Discharge Planning: PT eval-P FU    Assessment:  Please Check When Present   []  GCS  E   V  M     Heart Failure: []Acute, [] acute on chronic , []chronic  Heart Failure:  [] Diastolic (HFpEF), [] Systolic (HFrEF), []Combined (HFpEF and HFrEF), [] RHF, [] Pulm HTN, [] Other    [] ELEONORA, [] ATN, [] AIN, [] other  [] CKD1, [] CKD2, [] CKD 3, [] CKD 4, [] CKD 5, []ESRD    Encephalopathy: [] Metabolic, [] Hepatic, [] toxic, [] Neurological, [] Other    Abnormal Nurtitional Status: [] malnurtition (see nutrition note), [ ]underweight: BMI < 19, [] morbid obesity: BMI >40, [] Cachexia    [] Sepsis  [] hypovolemic shock,[] cardiogenic shock, [] hemorrhagic shock, [] neuogenic shock  [] Acute Respiratory Failure  []Cerebral edema, [] Brain compression/ herniation,   [] Functional quadriplegia  [] Acute blood loss anemia

## 2019-09-19 LAB
ANION GAP SERPL CALC-SCNC: 14 MMOL/L — SIGNIFICANT CHANGE UP (ref 5–17)
BASOPHILS # BLD AUTO: 0.02 K/UL — SIGNIFICANT CHANGE UP (ref 0–0.2)
BASOPHILS NFR BLD AUTO: 0.1 % — SIGNIFICANT CHANGE UP (ref 0–2)
BUN SERPL-MCNC: 19 MG/DL — SIGNIFICANT CHANGE UP (ref 7–23)
CALCIUM SERPL-MCNC: 10 MG/DL — SIGNIFICANT CHANGE UP (ref 8.4–10.5)
CHLORIDE SERPL-SCNC: 103 MMOL/L — SIGNIFICANT CHANGE UP (ref 96–108)
CO2 SERPL-SCNC: 24 MMOL/L — SIGNIFICANT CHANGE UP (ref 22–31)
CREAT SERPL-MCNC: 0.93 MG/DL — SIGNIFICANT CHANGE UP (ref 0.5–1.3)
EOSINOPHIL # BLD AUTO: 0 K/UL — SIGNIFICANT CHANGE UP (ref 0–0.5)
EOSINOPHIL NFR BLD AUTO: 0 % — SIGNIFICANT CHANGE UP (ref 0–6)
GLUCOSE BLDC GLUCOMTR-MCNC: 134 MG/DL — HIGH (ref 70–99)
GLUCOSE BLDC GLUCOMTR-MCNC: 139 MG/DL — HIGH (ref 70–99)
GLUCOSE BLDC GLUCOMTR-MCNC: 169 MG/DL — HIGH (ref 70–99)
GLUCOSE BLDC GLUCOMTR-MCNC: 210 MG/DL — HIGH (ref 70–99)
GLUCOSE SERPL-MCNC: 156 MG/DL — HIGH (ref 70–99)
HCT VFR BLD CALC: 47.8 % — SIGNIFICANT CHANGE UP (ref 39–50)
HGB BLD-MCNC: 15.7 G/DL — SIGNIFICANT CHANGE UP (ref 13–17)
IMM GRANULOCYTES NFR BLD AUTO: 0.5 % — SIGNIFICANT CHANGE UP (ref 0–1.5)
LYMPHOCYTES # BLD AUTO: 1.49 K/UL — SIGNIFICANT CHANGE UP (ref 1–3.3)
LYMPHOCYTES # BLD AUTO: 7.3 % — LOW (ref 13–44)
MCHC RBC-ENTMCNC: 26.6 PG — LOW (ref 27–34)
MCHC RBC-ENTMCNC: 32.8 GM/DL — SIGNIFICANT CHANGE UP (ref 32–36)
MCV RBC AUTO: 81 FL — SIGNIFICANT CHANGE UP (ref 80–100)
MONOCYTES # BLD AUTO: 0.84 K/UL — SIGNIFICANT CHANGE UP (ref 0–0.9)
MONOCYTES NFR BLD AUTO: 4.1 % — SIGNIFICANT CHANGE UP (ref 2–14)
NEUTROPHILS # BLD AUTO: 17.98 K/UL — HIGH (ref 1.8–7.4)
NEUTROPHILS NFR BLD AUTO: 88 % — HIGH (ref 43–77)
PLATELET # BLD AUTO: 186 K/UL — SIGNIFICANT CHANGE UP (ref 150–400)
POTASSIUM SERPL-MCNC: 4.4 MMOL/L — SIGNIFICANT CHANGE UP (ref 3.5–5.3)
POTASSIUM SERPL-SCNC: 4.4 MMOL/L — SIGNIFICANT CHANGE UP (ref 3.5–5.3)
RBC # BLD: 5.9 M/UL — HIGH (ref 4.2–5.8)
RBC # FLD: 16.4 % — HIGH (ref 10.3–14.5)
SODIUM SERPL-SCNC: 141 MMOL/L — SIGNIFICANT CHANGE UP (ref 135–145)
WBC # BLD: 20.43 K/UL — HIGH (ref 3.8–10.5)
WBC # FLD AUTO: 20.43 K/UL — HIGH (ref 3.8–10.5)

## 2019-09-19 RX ORDER — POLYETHYLENE GLYCOL 3350 17 G/17G
17 POWDER, FOR SOLUTION ORAL DAILY
Refills: 0 | Status: DISCONTINUED | OUTPATIENT
Start: 2019-09-19 | End: 2019-09-20

## 2019-09-19 RX ADMIN — Medication 1: at 13:26

## 2019-09-19 RX ADMIN — SIMVASTATIN 20 MILLIGRAM(S): 20 TABLET, FILM COATED ORAL at 22:18

## 2019-09-19 RX ADMIN — AMLODIPINE BESYLATE 5 MILLIGRAM(S): 2.5 TABLET ORAL at 05:18

## 2019-09-19 RX ADMIN — ENOXAPARIN SODIUM 40 MILLIGRAM(S): 100 INJECTION SUBCUTANEOUS at 12:17

## 2019-09-19 RX ADMIN — Medication 100 MILLIGRAM(S): at 22:18

## 2019-09-19 RX ADMIN — Medication 4 MILLIGRAM(S): at 12:17

## 2019-09-19 RX ADMIN — OXYCODONE HYDROCHLORIDE 5 MILLIGRAM(S): 5 TABLET ORAL at 18:18

## 2019-09-19 RX ADMIN — GABAPENTIN 300 MILLIGRAM(S): 400 CAPSULE ORAL at 05:18

## 2019-09-19 RX ADMIN — Medication 100 MILLIGRAM(S): at 05:18

## 2019-09-19 RX ADMIN — Medication 650 MILLIGRAM(S): at 05:19

## 2019-09-19 RX ADMIN — Medication 100 MILLIGRAM(S): at 12:17

## 2019-09-19 RX ADMIN — Medication 4 MILLIGRAM(S): at 07:55

## 2019-09-19 RX ADMIN — PANTOPRAZOLE SODIUM 40 MILLIGRAM(S): 20 TABLET, DELAYED RELEASE ORAL at 05:18

## 2019-09-19 RX ADMIN — SENNA PLUS 2 TABLET(S): 8.6 TABLET ORAL at 22:18

## 2019-09-19 RX ADMIN — GABAPENTIN 300 MILLIGRAM(S): 400 CAPSULE ORAL at 12:18

## 2019-09-19 RX ADMIN — Medication 650 MILLIGRAM(S): at 06:30

## 2019-09-19 RX ADMIN — Medication 4 MILLIGRAM(S): at 18:18

## 2019-09-19 RX ADMIN — Medication 1 TABLET(S): at 12:17

## 2019-09-19 RX ADMIN — GABAPENTIN 300 MILLIGRAM(S): 400 CAPSULE ORAL at 22:18

## 2019-09-19 RX ADMIN — Medication 4 MILLIGRAM(S): at 02:39

## 2019-09-19 NOTE — PROGRESS NOTE ADULT - ASSESSMENT
PROCEDURE: Anterior cervical diskectomy fusion C3-7     POD#2    PLAN:  Neuro: Dr Winslow to Review MRI and poss return to OR Sat for post decompression. Cont HMV drain.  Sharma DC'd 9/18 and voiding. 9/18 MRI C-spine-done Rpt-P. Cont Decadron 4mg Q6h. Leukocytosis from steroids. CBC-P FU. FU Path-[employer] activity/OOB.     Medicine note of 9/12-Pt is medically cleared for surgery. Trell Diop  [phone]    Respiratory: Patient instructed to use incentive spirometer [ X] YES [ ] NO              DVT ppx: [X ] SQL [ ] SQH and Venodynes [ ] Left [ ] Right [ X] Bilateral    Discharge Planning: PT eval-P FU    Assessment:  Please Check When Present   []  GCS  E   V  M     Heart Failure: []Acute, [] acute on chronic , []chronic  Heart Failure:  [] Diastolic (HFpEF), [] Systolic (HFrEF), []Combined (HFpEF and HFrEF), [] RHF, [] Pulm HTN, [] Other    [] ELEONORA, [] ATN, [] AIN, [] other  [] CKD1, [] CKD2, [] CKD 3, [] CKD 4, [] CKD 5, []ESRD    Encephalopathy: [] Metabolic, [] Hepatic, [] toxic, [] Neurological, [] Other    Abnormal Nurtitional Status: [] malnurtition (see nutrition note), [ ]underweight: BMI < 19, [] morbid obesity: BMI >40, [] Cachexia    [] Sepsis  [] hypovolemic shock,[] cardiogenic shock, [] hemorrhagic shock, [] neuogenic shock  [] Acute Respiratory Failure  []Cerebral edema, [] Brain compression/ herniation,   [] Functional quadriplegia  [] Acute blood loss anemia PROCEDURE: Anterior cervical diskectomy fusion C3-7     POD#2    PLAN:  Neuro: Dr Winslow to Review MRI and poss return to OR Sat for post decompression. Cont HMV drain.  Sharma DC'd 9/18 and voiding. 9/18 MRI C-spine-done Rpt-P. Cont Decadron 4mg Q6h. Leukocytosis from steroids. CBC-P FU. FU Path[employer] activity/OOB. Dsg changed this AM.    Medicine-NS follow up- MRI. ?Need for post procedure. Continue current regular insulin treatment of elevated blood sugars qid FS AC, HS with same RISS.  Incentive spirometry. VTE prophylaxis. Activity per NS. Will follow with you. Trell Diop  [phone]     Respiratory: Patient instructed to use incentive spirometer [ X] YES [ ] NO              DVT ppx: [X ] SQL [ ] SQH and Venodynes [ ] Left [ ] Right [ X] Bilateral    Discharge Planning: PT-outpt PT, no DME required.    Assessment:  Please Check When Present   []  GCS  E   V  M     Heart Failure: []Acute, [] acute on chronic , []chronic  Heart Failure:  [] Diastolic (HFpEF), [] Systolic (HFrEF), []Combined (HFpEF and HFrEF), [] RHF, [] Pulm HTN, [] Other    [] ELEONORA, [] ATN, [] AIN, [] other  [] CKD1, [] CKD2, [] CKD 3, [] CKD 4, [] CKD 5, []ESRD    Encephalopathy: [] Metabolic, [] Hepatic, [] toxic, [] Neurological, [] Other    Abnormal Nurtitional Status: [] malnurtition (see nutrition note), [ ]underweight: BMI < 19, [] morbid obesity: BMI >40, [] Cachexia    [] Sepsis  [] hypovolemic shock,[] cardiogenic shock, [] hemorrhagic shock, [] neuogenic shock  [] Acute Respiratory Failure  []Cerebral edema, [] Brain compression/ herniation,   [] Functional quadriplegia  [] Acute blood loss anemia

## 2019-09-19 NOTE — OCCUPATIONAL THERAPY INITIAL EVALUATION ADULT - RANGE OF MOTION EXAMINATION, UPPER EXTREMITY
bilateral UE Active ROM was WFL  (within functional limits)/R shoulder forward flexion/abduction PROM

## 2019-09-19 NOTE — PROGRESS NOTE ADULT - SUBJECTIVE AND OBJECTIVE BOX
POD#2- C3-C7 anterior discectomy and fusion.  Feels well w/o complaint.  Has been OOB  c/o inability to abduct R arm/ shoulder.  On steroids with elevated blood sugars. -176.  MRI done last night pending reading.	      Vital Signs Last 24 Hrs  T(C): 36.9 (19 Sep 2019 04:48), Max: 37.3 (18 Sep 2019 14:05)  T(F): 98.5 (19 Sep 2019 04:48), Max: 99.2 (18 Sep 2019 14:05)  HR: 80 (19 Sep 2019 04:48) (68 - 86)  BP: 145/86 (19 Sep 2019 04:48) (134/68 - 158/85)  BP(mean): --  RR: 18 (19 Sep 2019 04:48) (18 - 18)  SpO2: 96% (19 Sep 2019 04:48) (95% - 96%)  Daily     Daily     09-18 @ 07:01  -  09-19 @ 07:00  --------------------------------------------------------  IN: 1140 mL / OUT: 2950 mL / NET: -1810 mL        PHYSICAL EXAM:      Constitutional: No chills    Eyes: Anicteric    Neck: L drain    Respiratory: lungs clear bilaterally    Cardiovascular:  RRR nl S1S2, no M    Gastrointestinal:  Soft, NT    Extremities: No edema/ tenderness    Neurological: No R shoulder abduction, + elbow flex, weak  R.                       L, minimal weakness    Skin:  Warm, dry    Lymph Nodes: none palpable                                  15.1   17.49 )-----------( 179      ( 18 Sep 2019 08:25 )             45.8     09-18    140  |  101  |  16  ----------------------------<  180<H>  4.2   |  22  |  0.81    Ca    9.2      18 Sep 2019 06:35            MEDICATIONS  (STANDING):  amLODIPine   Tablet 5 milliGRAM(s) Oral daily  dexamethasone     Tablet 4 milliGRAM(s) Oral every 6 hours  docusate sodium 100 milliGRAM(s) Oral three times a day  enoxaparin Injectable 40 milliGRAM(s) SubCutaneous daily  gabapentin 300 milliGRAM(s) Oral three times a day  insulin lispro (HumaLOG) corrective regimen sliding scale   SubCutaneous three times a day before meals  multivitamin 1 Tablet(s) Oral daily  pantoprazole    Tablet 40 milliGRAM(s) Oral before breakfast  senna 2 Tablet(s) Oral at bedtime  simvastatin 20 milliGRAM(s) Oral at bedtime    MEDICATIONS  (PRN):  acetaminophen   Tablet .. 650 milliGRAM(s) Oral every 6 hours PRN Temp greater or equal to 38C (100.4F), Mild Pain (1 - 3)  diazepam    Tablet 5 milliGRAM(s) Oral every 6 hours PRN muscle spasm  famotidine    Tablet 20 milliGRAM(s) Oral every 12 hours PRN Dyspepsia  oxyCODONE    IR 5 milliGRAM(s) Oral every 6 hours PRN Moderate Pain (4 - 6)  oxyCODONE    IR 10 milliGRAM(s) Oral every 6 hours PRN Severe Pain (7 - 10)      Impression  POD#2, C3-C7 ACDF, R arm weakness. On Steroids.  MRI pending reading.  Elevated but reasonably controlled blood sugars  Steroid induced leukocytosis w/o clinical evidence of infection.  Normal pulmonary, cardiac, and renal function post op.  good UOP.  Pain controlled  VTE prophylaxis with SQ Lovenox 40 mg daily    Plan  NS follow up- MRI. ?Need for post procedure.  Continue current regular insulin treatment of elevated blood sugars qid FS AC, HS with same RISS.  Incentive spirometry  VTE prophylaxis.  Activity per NS  Will follow with you.    Trell Diop MD  [phone]

## 2019-09-19 NOTE — PROGRESS NOTE ADULT - SUBJECTIVE AND OBJECTIVE BOX
SUBJECTIVE: Stable, no new problems. Comfortable, NAD.     OVERNIGHT EVENTS: None    Vital Signs Last 24 Hrs  T(C): 36.9 (19 Sep 2019 04:48), Max: 37.3 (18 Sep 2019 14:05)  T(F): 98.5 (19 Sep 2019 04:48), Max: 99.2 (18 Sep 2019 14:05)  HR: 80 (19 Sep 2019 04:48) (68 - 86)  BP: 145/86 (19 Sep 2019 04:48) (134/68 - 158/85)  BP(mean): --  RR: 18 (19 Sep 2019 04:48) (18 - 18)  SpO2: 96% (19 Sep 2019 04:48) (95% - 96%)Vital Signs Last 24 Hrs  T(C): 36.9 (19 Sep 2019 04:48), Max: 37.3 (18 Sep 2019 14:05)  T(F): 98.5 (19 Sep 2019 04:48), Max: 99.2 (18 Sep 2019 14:05)  HR: 80 (19 Sep 2019 04:48) (68 - 86)  BP: 145/86 (19 Sep 2019 04:48) (134/68 - 158/85)  BP(mean): --  RR: 18 (19 Sep 2019 04:48) (18 - 18)  SpO2: 96% (19 Sep 2019 04:48) (95% - 96%)  IVF: [X ] IVL [ ] NS  DIET: [ X] Regular [ ] CCD [ ] Renal [ ] Puree [ ] Dysphagia [ ] Tube Feeds:   PCA: [ ] YES [X ] NO   SUBRAMANIAN: [X ] YES-DC 9/18 [ ] NO [X ] VOID   BM: [ ] YES [ X] NO     DRAINS: [ ] ARON (cc/24h) [X ] HMV 50(cc/24h)    PHYSICAL EXAM:    Constitutional: No Acute Distress     Neurological: No interval change. AOx3, Following Commands, Moving all Extremities     Motor exam:          Upper extremity                         Delt     Bicep     Tricep    HG                                                 R        2- 3/5     4+/5      4+/5       5/5                                               L          5/5        5/5        5/5       5/5          Lower extremity                        HF         KF        KE       DF         PF                                                  R        5/5        5/5        5/5       5/5         5/5                                               L         5/5        5/5       5/5       5/5          5/5                                                 Sensation: [X] intact to light touch  [] decreased:     Pulmonary: Clear to Auscultation, No rales, No rhonchi, No wheezes     Cardiovascular: S1, S2, Regular rate and rhythm     Gastrointestinal: Soft, Non-tender, Non-distended     Extremities: No calf tenderness     Incision: HMV active. +steri. CDI/Flat. No dysphagia/dysphonia    LABS:                        15.1   17.49 )-----------( 179      ( 18 Sep 2019 08:25 )             45.8    09-18 09-19    141  |  103  |  19  ----------------------------<  156<H>  4.4   |  24  |  0.93    Ca    10.0      19 Sep 2019 07:04    IMAGING:  < from: VA Duplex Carotid, Bilat (09.16.19 @ 13:40) >  Minimal atheromatous intimal thickening and irregularity is present along   the course of the carotid arteries in the neck.    Blood flow velocities are as follows:    RIGHT:    PROX CCA = 71 ;  DIST CCA = 54 ;  PROX ICA = 49 ;  DIST ICA =   47 ;  ECA = 37    LEFT   :    PROX CCA = 83 ;  DIST CCA = 57 ;  PROX ICA = 34 ;  DIST ICA =   39 ;  ECA = 73    There is antegrade flow through both vertebral arteries.    IMPRESSION: No hemodynamically significant carotid artery stenoses.    << from: MR Cervical Spine No Cont (09.10.19 @ 17:41) >  Multilevel degenerative changes resulting in martha spinal cord   compression at C3-C4. Abnormal intrinsic cord signal at this level may   represent cord edema.    Cord impingement at C5-C6 and C6-C7. Broad-based disc protrusion reaches   the cord at C4-C5.    Multilevel uncinate hypertrophy. C4-C5 moderate bilateral neural   < from: CT Cervical Spine No Cont (09.17.19 @ 21:53) >  Status post C3 through C7 anterior discectomy and placement of artificial   disc spacers extending from C3 to C7, with left-sided surgical drain.   Expected postsurgical prevertebral edema and subcutaneous emphysema.   Redemonstration of multilevel degenerative change causing foraminal and   central narrowing as detailed above. Canal contents are suboptimally seen   on CT, MR may be obtained for improved assessment as clinically warranted.      MEDICATIONS  (STANDING):  amLODIPine   Tablet 5 milliGRAM(s) Oral daily  dexamethasone     Tablet 4 milliGRAM(s) Oral every 6 hours  docusate sodium 100 milliGRAM(s) Oral three times a day  enoxaparin Injectable 40 milliGRAM(s) SubCutaneous daily  gabapentin 300 milliGRAM(s) Oral three times a day  insulin lispro (HumaLOG) corrective regimen sliding scale   SubCutaneous three times a day before meals  multivitamin 1 Tablet(s) Oral daily  pantoprazole    Tablet 40 milliGRAM(s) Oral before breakfast  senna 2 Tablet(s) Oral at bedtime  simvastatin 20 milliGRAM(s) Oral at bedtime    MEDICATIONS  (PRN):  acetaminophen   Tablet .. 650 milliGRAM(s) Oral every 6 hours PRN Temp greater or equal to 38C (100.4F), Mild Pain (1 - 3)  diazepam    Tablet 5 milliGRAM(s) Oral every 6 hours PRN muscle spasm  famotidine    Tablet 20 milliGRAM(s) Oral every 12 hours PRN Dyspepsia  oxyCODONE    IR 5 milliGRAM(s) Oral every 6 hours PRN Moderate Pain (4 - 6)  oxyCODONE    IR 10 milliGRAM(s) Oral every 6 hours PRN Severe Pain (7 - 10) SUBJECTIVE: Stable, no new problems. Comfortable, NAD.     OVERNIGHT EVENTS: None    Vital Signs Last 24 Hrs  T(C): 36.9 (19 Sep 2019 04:48), Max: 37.3 (18 Sep 2019 14:05)  T(F): 98.5 (19 Sep 2019 04:48), Max: 99.2 (18 Sep 2019 14:05)  HR: 80 (19 Sep 2019 04:48) (68 - 86)  BP: 145/86 (19 Sep 2019 04:48) (134/68 - 158/85)  BP(mean): --  RR: 18 (19 Sep 2019 04:48) (18 - 18)  SpO2: 96% (19 Sep 2019 04:48) (95% - 96%)Vital Signs Last 24 Hrs  T(C): 36.9 (19 Sep 2019 04:48), Max: 37.3 (18 Sep 2019 14:05)  T(F): 98.5 (19 Sep 2019 04:48), Max: 99.2 (18 Sep 2019 14:05)  HR: 80 (19 Sep 2019 04:48) (68 - 86)  BP: 145/86 (19 Sep 2019 04:48) (134/68 - 158/85)  BP(mean): --  RR: 18 (19 Sep 2019 04:48) (18 - 18)  SpO2: 96% (19 Sep 2019 04:48) (95% - 96%)  IVF: [X ] IVL [ ] NS  DIET: [ X] Regular [ ] CCD [ ] Renal [ ] Puree [ ] Dysphagia [ ] Tube Feeds:   PCA: [ ] YES [X ] NO   SUBRAMANIAN: [X ] YES-DC 9/18 [ ] NO [X ] VOID   BM: [ ] YES [ X] NO     DRAINS: [ ] ARON (cc/24h) [X ] HMV 50(cc/24h)    PHYSICAL EXAM:    Constitutional: No Acute Distress     Neurological: No interval change. AOx3, Following Commands, Moving all Extremities     Motor exam:          Upper extremity                         Delt     Bicep     Tricep    HG                                                 R        2- 3/5     4+/5      4+/5       5/5                                               L          5/5        5/5        5/5       5/5          Lower extremity                        HF         KF        KE       DF         PF                                                  R        5/5        5/5        5/5       5/5         5/5                                               L         5/5        5/5       5/5       5/5          5/5                                                 Sensation: [X] intact to light touch  [] decreased:     Pulmonary: Clear to Auscultation, No rales, No rhonchi, No wheezes     Cardiovascular: S1, S2, Regular rate and rhythm     Gastrointestinal: Soft, Non-tender, Non-distended     Extremities: No calf tenderness     Incision: HMV active. +steri. CDI/Flat. No dysphagia/dysphonia    LABS:                        15.1   17.49 )-----------( 179      ( 18 Sep 2019 08:25 )             45.8    09-18 09-19    141  |  103  |  19  ----------------------------<  156<H>  4.4   |  24  |  0.93    Ca    10.0      19 Sep 2019 07:04    IMAGING:  < from: VA Duplex Carotid, Bilat (09.16.19 @ 13:40) >  Minimal atheromatous intimal thickening and irregularity is present along   the course of the carotid arteries in the neck.    Blood flow velocities are as follows:    RIGHT:    PROX CCA = 71 ;  DIST CCA = 54 ;  PROX ICA = 49 ;  DIST ICA =   47 ;  ECA = 37    LEFT   :    PROX CCA = 83 ;  DIST CCA = 57 ;  PROX ICA = 34 ;  DIST ICA =   39 ;  ECA = 73    There is antegrade flow through both vertebral arteries.    IMPRESSION: No hemodynamically significant carotid artery stenoses.    << from: MR Cervical Spine No Cont (09.10.19 @ 17:41) >  Multilevel degenerative changes resulting in martha spinal cord   compression at C3-C4. Abnormal intrinsic cord signal at this level may   represent cord edema.    Cord impingement at C5-C6 and C6-C7. Broad-based disc protrusion reaches   the cord at C4-C5.    Multilevel uncinate hypertrophy. C4-C5 moderate bilateral neural   < from: CT Cervical Spine No Cont (09.17.19 @ 21:53) >  Status post C3 through C7 anterior discectomy and placement of artificial   disc spacers extending from C3 to C7, with left-sided surgical drain.   Expected postsurgical prevertebral edema and subcutaneous emphysema.   Redemonstration of multilevel degenerative change causing foraminal and   central narrowing as detailed above. Canal contents are suboptimally seen   on CT, MR may be obtained for improved assessment as clinically warranted.    MEDICATIONS  (STANDING):  amLODIPine   Tablet 5 milliGRAM(s) Oral daily  dexamethasone     Tablet 4 milliGRAM(s) Oral every 6 hours  docusate sodium 100 milliGRAM(s) Oral three times a day  enoxaparin Injectable 40 milliGRAM(s) SubCutaneous daily  gabapentin 300 milliGRAM(s) Oral three times a day  insulin lispro (HumaLOG) corrective regimen sliding scale   SubCutaneous three times a day before meals  multivitamin 1 Tablet(s) Oral daily  pantoprazole    Tablet 40 milliGRAM(s) Oral before breakfast  senna 2 Tablet(s) Oral at bedtime  simvastatin 20 milliGRAM(s) Oral at bedtime    MEDICATIONS  (PRN):  acetaminophen   Tablet .. 650 milliGRAM(s) Oral every 6 hours PRN Temp greater or equal to 38C (100.4F), Mild Pain (1 - 3)  diazepam    Tablet 5 milliGRAM(s) Oral every 6 hours PRN muscle spasm  famotidine    Tablet 20 milliGRAM(s) Oral every 12 hours PRN Dyspepsia  oxyCODONE    IR 5 milliGRAM(s) Oral every 6 hours PRN Moderate Pain (4 - 6)  oxyCODONE    IR 10 milliGRAM(s) Oral every 6 hours PRN Severe Pain (7 - 10)

## 2019-09-19 NOTE — OCCUPATIONAL THERAPY INITIAL EVALUATION ADULT - MANUAL MUSCLE TESTING RESULTS, REHAB EVAL
grossly assessed due to/except R shoulder forward flexion 2-/5, shoulder abduction 2-/5, shoulder shrug/retraction at least 3/5, R elbow/wrist/digits at least 3/5, LUE 5/5, BLE at least 3/5

## 2019-09-19 NOTE — OCCUPATIONAL THERAPY INITIAL EVALUATION ADULT - DIAGNOSIS, OT EVAL
Pt demonstrated decreased strength and ROM impacting pt's ability to participate in functional mobility and ADLs. q

## 2019-09-20 ENCOUNTER — TRANSCRIPTION ENCOUNTER (OUTPATIENT)
Age: 59
End: 2019-09-20

## 2019-09-20 VITALS
HEART RATE: 84 BPM | SYSTOLIC BLOOD PRESSURE: 141 MMHG | RESPIRATION RATE: 18 BRPM | DIASTOLIC BLOOD PRESSURE: 84 MMHG | TEMPERATURE: 99 F | OXYGEN SATURATION: 99 %

## 2019-09-20 LAB
ANION GAP SERPL CALC-SCNC: 14 MMOL/L — SIGNIFICANT CHANGE UP (ref 5–17)
BASOPHILS # BLD AUTO: 0.02 K/UL — SIGNIFICANT CHANGE UP (ref 0–0.2)
BASOPHILS NFR BLD AUTO: 0.1 % — SIGNIFICANT CHANGE UP (ref 0–2)
BUN SERPL-MCNC: 25 MG/DL — HIGH (ref 7–23)
CALCIUM SERPL-MCNC: 9.3 MG/DL — SIGNIFICANT CHANGE UP (ref 8.4–10.5)
CHLORIDE SERPL-SCNC: 97 MMOL/L — SIGNIFICANT CHANGE UP (ref 96–108)
CO2 SERPL-SCNC: 24 MMOL/L — SIGNIFICANT CHANGE UP (ref 22–31)
CREAT SERPL-MCNC: 0.94 MG/DL — SIGNIFICANT CHANGE UP (ref 0.5–1.3)
EOSINOPHIL # BLD AUTO: 0 K/UL — SIGNIFICANT CHANGE UP (ref 0–0.5)
EOSINOPHIL NFR BLD AUTO: 0 % — SIGNIFICANT CHANGE UP (ref 0–6)
GLUCOSE BLDC GLUCOMTR-MCNC: 141 MG/DL — HIGH (ref 70–99)
GLUCOSE BLDC GLUCOMTR-MCNC: 152 MG/DL — HIGH (ref 70–99)
GLUCOSE SERPL-MCNC: 189 MG/DL — HIGH (ref 70–99)
HCT VFR BLD CALC: 43 % — SIGNIFICANT CHANGE UP (ref 39–50)
HGB BLD-MCNC: 14.6 G/DL — SIGNIFICANT CHANGE UP (ref 13–17)
IMM GRANULOCYTES NFR BLD AUTO: 0.7 % — SIGNIFICANT CHANGE UP (ref 0–1.5)
LYMPHOCYTES # BLD AUTO: 1.3 K/UL — SIGNIFICANT CHANGE UP (ref 1–3.3)
LYMPHOCYTES # BLD AUTO: 6.6 % — LOW (ref 13–44)
MCHC RBC-ENTMCNC: 27.5 PG — SIGNIFICANT CHANGE UP (ref 27–34)
MCHC RBC-ENTMCNC: 34 GM/DL — SIGNIFICANT CHANGE UP (ref 32–36)
MCV RBC AUTO: 81 FL — SIGNIFICANT CHANGE UP (ref 80–100)
MONOCYTES # BLD AUTO: 0.66 K/UL — SIGNIFICANT CHANGE UP (ref 0–0.9)
MONOCYTES NFR BLD AUTO: 3.4 % — SIGNIFICANT CHANGE UP (ref 2–14)
NEUTROPHILS # BLD AUTO: 17.45 K/UL — HIGH (ref 1.8–7.4)
NEUTROPHILS NFR BLD AUTO: 89.2 % — HIGH (ref 43–77)
PLATELET # BLD AUTO: 185 K/UL — SIGNIFICANT CHANGE UP (ref 150–400)
POTASSIUM SERPL-MCNC: 3.6 MMOL/L — SIGNIFICANT CHANGE UP (ref 3.5–5.3)
POTASSIUM SERPL-SCNC: 3.6 MMOL/L — SIGNIFICANT CHANGE UP (ref 3.5–5.3)
RBC # BLD: 5.31 M/UL — SIGNIFICANT CHANGE UP (ref 4.2–5.8)
RBC # FLD: 15.4 % — HIGH (ref 10.3–14.5)
SODIUM SERPL-SCNC: 135 MMOL/L — SIGNIFICANT CHANGE UP (ref 135–145)
WBC # BLD: 19.56 K/UL — HIGH (ref 3.8–10.5)
WBC # FLD AUTO: 19.56 K/UL — HIGH (ref 3.8–10.5)

## 2019-09-20 PROCEDURE — 82962 GLUCOSE BLOOD TEST: CPT

## 2019-09-20 PROCEDURE — 97165 OT EVAL LOW COMPLEX 30 MIN: CPT

## 2019-09-20 PROCEDURE — 88311 DECALCIFY TISSUE: CPT

## 2019-09-20 PROCEDURE — C1713: CPT

## 2019-09-20 PROCEDURE — 80048 BASIC METABOLIC PNL TOTAL CA: CPT

## 2019-09-20 PROCEDURE — 88304 TISSUE EXAM BY PATHOLOGIST: CPT

## 2019-09-20 PROCEDURE — C1769: CPT

## 2019-09-20 PROCEDURE — C1889: CPT

## 2019-09-20 PROCEDURE — 97161 PT EVAL LOW COMPLEX 20 MIN: CPT

## 2019-09-20 PROCEDURE — 76000 FLUOROSCOPY <1 HR PHYS/QHP: CPT

## 2019-09-20 PROCEDURE — 72141 MRI NECK SPINE W/O DYE: CPT

## 2019-09-20 PROCEDURE — 72125 CT NECK SPINE W/O DYE: CPT

## 2019-09-20 PROCEDURE — 85027 COMPLETE CBC AUTOMATED: CPT

## 2019-09-20 RX ORDER — DOCUSATE SODIUM 100 MG
1 CAPSULE ORAL
Qty: 0 | Refills: 0 | DISCHARGE
Start: 2019-09-20

## 2019-09-20 RX ORDER — DEXAMETHASONE 0.5 MG/5ML
2 ELIXIR ORAL
Qty: 30 | Refills: 0
Start: 2019-09-20 | End: 2019-09-25

## 2019-09-20 RX ORDER — SENNA PLUS 8.6 MG/1
2 TABLET ORAL
Qty: 0 | Refills: 0 | DISCHARGE
Start: 2019-09-20

## 2019-09-20 RX ORDER — OXYCODONE HYDROCHLORIDE 5 MG/1
1 TABLET ORAL
Qty: 28 | Refills: 0
Start: 2019-09-20 | End: 2019-09-26

## 2019-09-20 RX ORDER — ACETAMINOPHEN 500 MG
2 TABLET ORAL
Qty: 0 | Refills: 0 | DISCHARGE
Start: 2019-09-20

## 2019-09-20 RX ORDER — PANTOPRAZOLE SODIUM 20 MG/1
1 TABLET, DELAYED RELEASE ORAL
Qty: 15 | Refills: 0
Start: 2019-09-20 | End: 2019-10-04

## 2019-09-20 RX ADMIN — Medication 100 MILLIGRAM(S): at 05:31

## 2019-09-20 RX ADMIN — Medication 4 MILLIGRAM(S): at 12:08

## 2019-09-20 RX ADMIN — Medication 4 MILLIGRAM(S): at 00:41

## 2019-09-20 RX ADMIN — ENOXAPARIN SODIUM 40 MILLIGRAM(S): 100 INJECTION SUBCUTANEOUS at 12:08

## 2019-09-20 RX ADMIN — OXYCODONE HYDROCHLORIDE 5 MILLIGRAM(S): 5 TABLET ORAL at 12:14

## 2019-09-20 RX ADMIN — PANTOPRAZOLE SODIUM 40 MILLIGRAM(S): 20 TABLET, DELAYED RELEASE ORAL at 05:31

## 2019-09-20 RX ADMIN — Medication 1: at 12:41

## 2019-09-20 RX ADMIN — OXYCODONE HYDROCHLORIDE 5 MILLIGRAM(S): 5 TABLET ORAL at 12:39

## 2019-09-20 RX ADMIN — AMLODIPINE BESYLATE 5 MILLIGRAM(S): 2.5 TABLET ORAL at 05:31

## 2019-09-20 RX ADMIN — GABAPENTIN 300 MILLIGRAM(S): 400 CAPSULE ORAL at 05:31

## 2019-09-20 RX ADMIN — Medication 1 TABLET(S): at 12:09

## 2019-09-20 RX ADMIN — POLYETHYLENE GLYCOL 3350 17 GRAM(S): 17 POWDER, FOR SOLUTION ORAL at 22:00

## 2019-09-20 RX ADMIN — Medication 4 MILLIGRAM(S): at 05:31

## 2019-09-20 NOTE — DISCHARGE NOTE PROVIDER - NSDCCPCAREPLAN_GEN_ALL_CORE_FT
PRINCIPAL DISCHARGE DIAGNOSIS  Diagnosis: Spinal stenosis, cervical region  Assessment and Plan of Treatment: Spinal stenosis, cervical region

## 2019-09-20 NOTE — DISCHARGE NOTE PROVIDER - REASON FOR ADMISSION
58yo male pt with PMHx, of HTN, HLD, ambulatory c/o neck/ upper back pain and B/L arm burning tingling pain/ weakness s/p fall one hour ago. Pt stated he slipped and fell hitting upper back on the bathtub. Denies LOC or head injury. Denies headache, dizziness or visual changes. Denies N/V. Denies CP/SOB/ABD pain. Denies pelvic or hip pain. Denies fever, chills or recent sickness.

## 2019-09-20 NOTE — DISCHARGE NOTE NURSING/CASE MANAGEMENT/SOCIAL WORK - NSDCFUADDAPPT_GEN_ALL_CORE_FT
An appointment was made for you with your Neurosurgeon for a followup visit on 9/27/2019 at 3pm. Please call to confirm your appointment.

## 2019-09-20 NOTE — DISCHARGE NOTE NURSING/CASE MANAGEMENT/SOCIAL WORK - PATIENT PORTAL LINK FT
You can access the FollowMyHealth Patient Portal offered by [facility] by registering at the following website: [email or web address]. By joining [facility]’s FollowMyHealth portal, you will also be able to view your health information using other applications (apps) compatible with our system.

## 2019-09-20 NOTE — PROGRESS NOTE ADULT - ASSESSMENT
PROCEDURE: Anterior cervical diskectomy fusion C3-7     POD#3    PLAN:  Neuro: Pt seen by Dr Winslow at this time-Rt shoulder improving, Cont Steroids and taper over 5days. FU outpt on 9/27.  Leukocytosis from steroids.  FU Path-P[employer] Inc activity/OOB. DC Home today.     Medicine-NS follow up- MRI. ?Need for post procedure. Continue current regular insulin treatment of elevated blood sugars qid FS AC, HS with same RISS.  Incentive spirometry. VTE prophylaxis. Activity per NS. Will follow with you. Trell Diop  [phone]     Respiratory: Patient instructed to use incentive spirometer [ X] YES [ ] NO              DVT ppx: [X ] SQL [ ] SQH and Venodynes [ ] Left [ ] Right [ X] Bilateral    Discharge Planning: PT/OT-outpt PT/OT, no DME required.    Assessment:  Please Check When Present   []  GCS  E   V  M     Heart Failure: []Acute, [] acute on chronic , []chronic  Heart Failure:  [] Diastolic (HFpEF), [] Systolic (HFrEF), []Combined (HFpEF and HFrEF), [] RHF, [] Pulm HTN, [] Other    [] ELEONORA, [] ATN, [] AIN, [] other  [] CKD1, [] CKD2, [] CKD 3, [] CKD 4, [] CKD 5, []ESRD    Encephalopathy: [] Metabolic, [] Hepatic, [] toxic, [] Neurological, [] Other    Abnormal Nurtitional Status: [] malnurtition (see nutrition note), [ ]underweight: BMI < 19, [] morbid obesity: BMI >40, [] Cachexia    [] Sepsis  [] hypovolemic shock,[] cardiogenic shock, [] hemorrhagic shock, [] neuogenic shock  [] Acute Respiratory Failure  []Cerebral edema, [] Brain compression/ herniation,   [] Functional quadriplegia  [] Acute blood loss anemia

## 2019-09-20 NOTE — PROGRESS NOTE ADULT - ATTENDING COMMENTS
PT seen earlier today. He was alert.  No pain, eating well, ambulating.  Right deltoid was 2/5 today as he was able to slide the arm in abduction to 90 degrees.  There was no antigravity movement.  Mild improvement noted since two days ago.  MRI reviewed and there is residual foraminal stenosis, C34 is well decompressed with small amount of cord signal change at that level. There is some residual canal stenosis at C67.  Given improvement and discussion with colleagues, will continue to observe for spontaneous recovery. Discussed with pt and family that this can take some time.  There is possibility in the future that pt may need posterior decompression but for now, will need to follow up imaging in a few weeks to evaluate instrumentation position and monitor for settling.  We will prescribe a bone growth stimulator as well.  He will start PT for UE and return for follow up in one week.

## 2019-09-20 NOTE — DISCHARGE NOTE PROVIDER - NSDCACTIVITY_GEN_ALL_CORE
Do not drive or operate machinery/Do not make important decisions/Stairs allowed/Walking - Indoors allowed/No heavy lifting/straining/Bathing allowed/Walking - Outdoors allowed/Showering allowed

## 2019-09-20 NOTE — PROGRESS NOTE ADULT - SUBJECTIVE AND OBJECTIVE BOX
SUBJECTIVE: Stable, no new problems. Amb in hallway ad april. Rt shoulder slight improved    OVERNIGHT EVENTS: None    Vital Signs Last 24 Hrs  T(C): 37 (20 Sep 2019 08:11), Max: 37.5 (19 Sep 2019 14:20)  T(F): 98.6 (20 Sep 2019 08:11), Max: 99.5 (19 Sep 2019 14:20)  HR: 84 (20 Sep 2019 08:11) (78 - 98)  BP: 141/84 (20 Sep 2019 08:11) (133/70 - 142/94)  BP(mean): --  RR: 18 (20 Sep 2019 08:11) (18 - 18)  SpO2: 99% (20 Sep 2019 08:11) (94% - 99%)  IVF: [X ] IVL [ ] NS  DIET: [ X] Regular [ ] CCD [ ] Renal [ ] Puree [ ] Dysphagia [ ] Tube Feeds:   PCA: [ ] YES [X ] NO   SUBRAMANIAN: [X ] YES-DC 9/18 [ ] NO [X ] VOID   BM: [X ] YES-on 9/20    DRAINS: [ ] ARON (cc/24h) [X ] HMV 10(cc/24h)    PHYSICAL EXAM:    Constitutional: No Acute Distress     Neurological: No interval change. AOx3, Following Commands, Moving all Extremities     Motor exam:          Upper extremity                         Delt     Bicep     Tricep    HG                                                 R        2- 3/5     4+/5      4+/5       5/5                                               L          5/5        5/5        5/5       5/5          Lower extremity                        HF         KF        KE       DF         PF                                                  R        5/5        5/5        5/5     5/5         5/5                                               L         5/5        5/5       5/5      5/5          5/5                                                 Sensation: [X] intact to light touch  [] decreased:     Pulmonary: Clear to Auscultation, No rales, No rhonchi, No wheezes     Cardiovascular: S1, S2, Regular rate and rhythm     Gastrointestinal: Soft, Non-tender, Non-distended     Extremities: No calf tenderness     Incision: HMV active. +steri. CDI/Flat. No dysphagia/dysphonia    LABS:                                   14.6   19.56 )-----------( 185      ( 20 Sep 2019 09:02 )             43.0      09-18    09-19    141  |  103  |  19  ----------------------------<  156<H>  4.4   |  24  |  0.93    Ca    10.0      19 Sep 2019 07:04    IMAGING:  < from: VA Duplex Carotid, Bilat (09.16.19 @ 13:40) >  Minimal atheromatous intimal thickening and irregularity is present along   the course of the carotid arteries in the neck.    Blood flow velocities are as follows:    RIGHT:    PROX CCA = 71 ;  DIST CCA = 54 ;  PROX ICA = 49 ;  DIST ICA =   47 ;  ECA = 37    LEFT   :    PROX CCA = 83 ;  DIST CCA = 57 ;  PROX ICA = 34 ;  DIST ICA =   39 ;  ECA = 73    There is antegrade flow through both vertebral arteries.    IMPRESSION: No hemodynamically significant carotid artery stenoses.    << from: MR Cervical Spine No Cont (09.10.19 @ 17:41) >  Multilevel degenerative changes resulting in martha spinal cord   compression at C3-C4. Abnormal intrinsic cord signal at this level may   represent cord edema.    Cord impingement at C5-C6 and C6-C7. Broad-based disc protrusion reaches   the cord at C4-C5.    Multilevel uncinate hypertrophy. C4-C5 moderate bilateral neural   < from: CT Cervical Spine No Cont (09.17.19 @ 21:53) >  Status post C3 through C7 anterior discectomy and placement of artificial   disc spacers extending from C3 to C7, with left-sided surgical drain.   Expected postsurgical prevertebral edema and subcutaneous emphysema.   Redemonstration of multilevel degenerative change causing foraminal and   central narrowing as detailed above. Canal contents are suboptimally seen   on CT, MR may be obtained for improved assessment as clinically warranted.    MEDICATIONS  (STANDING):  amLODIPine   Tablet 5 milliGRAM(s) Oral daily  dexamethasone     Tablet 4 milliGRAM(s) Oral every 6 hours  docusate sodium 100 milliGRAM(s) Oral three times a day  enoxaparin Injectable 40 milliGRAM(s) SubCutaneous daily  gabapentin 300 milliGRAM(s) Oral three times a day  insulin lispro (HumaLOG) corrective regimen sliding scale   SubCutaneous three times a day before meals  multivitamin 1 Tablet(s) Oral daily  pantoprazole    Tablet 40 milliGRAM(s) Oral before breakfast  senna 2 Tablet(s) Oral at bedtime  simvastatin 20 milliGRAM(s) Oral at bedtime    MEDICATIONS  (PRN):  acetaminophen   Tablet .. 650 milliGRAM(s) Oral every 6 hours PRN Temp greater or equal to 38C (100.4F), Mild Pain (1 - 3)  diazepam    Tablet 5 milliGRAM(s) Oral every 6 hours PRN muscle spasm  famotidine    Tablet 20 milliGRAM(s) Oral every 12 hours PRN Dyspepsia  oxyCODONE    IR 5 milliGRAM(s) Oral every 6 hours PRN Moderate Pain (4 - 6)  oxyCODONE    IR 10 milliGRAM(s) Oral every 6 hours PRN Severe Pain (7 - 10)  polyethylene glycol 3350 17 Gram(s) Oral daily PRN Constipation

## 2019-09-20 NOTE — DISCHARGE NOTE PROVIDER - NSDCFUADDINST_GEN_ALL_CORE_FT
Followup with your Private MD in 1-2 weeks.  Return to Emergency Department or contact your Neurosurgeon if any changes in mental status, weakness, numbness or tingling of extremities; difficulty swallowing; drainage or redness of wound, fever; pain in legs; difficulty urinating or constipation.  Donot restart your Aspirin or take any Motrin/NSAIDS until checking with your Neurosurgeon.  No strenous activity. No heavy lifting. Do not return to work until cleared by physician. No driving until cleared by physician.  Remove dressing on 3rd day after your surgery and leave incision open to air. You may shower on the 3rd day after you surgery.  No soaking in tub,  Donot remove steri strips. Donot apply any ointements to incision.

## 2019-09-20 NOTE — DISCHARGE NOTE PROVIDER - HOSPITAL COURSE
PROCEDURE: Anterior cervical diskectomy fusion C3-7         POD#3        PLAN:    Neuro: Pt seen by Dr Winslow at this time-Rt shoulder improving, Cont Steroids and taper over 5days. FU outpt on 9/27.  Leukocytosis from steroids.  FU Path-P. Inc activity/OOB. DC Home today.         Medicine-NS follow up- MRI. ?Need for post procedure. Continue current regular insulin treatment of elevated blood sugars qid FS AC, HS with same RISS.    Incentive spirometry. VTE prophylaxis. Activity per NS. Will follow with you. Trell Diop  [phone]         Respiratory: Patient instructed to use incentive spirometer [ X] YES [ ] NO                  DVT ppx: [X ] SQL [ ] SQH and Venodynes [ ] Left [ ] Right [ X] Bilateral        Discharge Planning: PT/OT-outpt PT/OT, no DME required.

## 2019-09-20 NOTE — DISCHARGE NOTE PROVIDER - NSDCFUADDAPPT_GEN_ALL_CORE_FT
Please call your Neurosurgeon to make an appointment for 9/27/2019 for wound check and further recommendations. An appointment was made for you with your Neurosurgeon for a followup visit on 9/27/2019 at 3pm. Please call to confirm your appointment.

## 2019-09-20 NOTE — DISCHARGE NOTE PROVIDER - CARE PROVIDER_API CALL
Colt Winslow)  Neurological Surgery  [address] [place] [address], [address] [place] [address]  Phone: [phone]  Fax: [phone]  Follow Up Time:

## 2019-09-21 ENCOUNTER — APPOINTMENT (OUTPATIENT)
Dept: NEUROSURGERY | Facility: CLINIC | Age: 59
End: 2019-09-21

## 2019-09-23 LAB — SURGICAL PATHOLOGY STUDY: SIGNIFICANT CHANGE UP

## 2019-09-27 ENCOUNTER — APPOINTMENT (OUTPATIENT)
Dept: NEUROSURGERY | Facility: CLINIC | Age: 59
End: 2019-09-27
Payer: COMMERCIAL

## 2019-09-27 VITALS
WEIGHT: 180 LBS | HEIGHT: 71 IN | BODY MASS INDEX: 25.2 KG/M2 | SYSTOLIC BLOOD PRESSURE: 127 MMHG | DIASTOLIC BLOOD PRESSURE: 80 MMHG | HEART RATE: 73 BPM | RESPIRATION RATE: 17 BRPM | OXYGEN SATURATION: 98 % | TEMPERATURE: 97.5 F

## 2019-09-27 PROCEDURE — 99024 POSTOP FOLLOW-UP VISIT: CPT

## 2019-10-28 ENCOUNTER — APPOINTMENT (OUTPATIENT)
Dept: NEUROSURGERY | Facility: CLINIC | Age: 59
End: 2019-10-28
Payer: COMMERCIAL

## 2019-10-28 VITALS
DIASTOLIC BLOOD PRESSURE: 70 MMHG | HEIGHT: 71 IN | RESPIRATION RATE: 17 BRPM | BODY MASS INDEX: 25.2 KG/M2 | OXYGEN SATURATION: 98 % | HEART RATE: 82 BPM | SYSTOLIC BLOOD PRESSURE: 113 MMHG | TEMPERATURE: 98.5 F | WEIGHT: 180 LBS

## 2019-10-28 DIAGNOSIS — M47.12 OTHER SPONDYLOSIS WITH MYELOPATHY, CERVICAL REGION: ICD-10-CM

## 2019-10-28 DIAGNOSIS — Z78.9 OTHER SPECIFIED HEALTH STATUS: ICD-10-CM

## 2019-10-28 DIAGNOSIS — E78.5 HYPERLIPIDEMIA, UNSPECIFIED: ICD-10-CM

## 2019-10-28 DIAGNOSIS — I10 ESSENTIAL (PRIMARY) HYPERTENSION: ICD-10-CM

## 2019-10-28 PROCEDURE — 99024 POSTOP FOLLOW-UP VISIT: CPT

## 2019-10-29 PROBLEM — M47.12 CERVICAL SPONDYLOSIS WITH MYELOPATHY: Status: ACTIVE | Noted: 2019-09-16

## 2019-10-29 PROBLEM — E78.5 HYPERLIPIDEMIA: Status: RESOLVED | Noted: 2019-10-29 | Resolved: 2019-10-29

## 2019-10-29 PROBLEM — I10 HIGH BLOOD PRESSURE: Status: RESOLVED | Noted: 2019-10-29 | Resolved: 2019-10-29

## 2019-10-29 PROBLEM — Z78.9 CURRENT NON-SMOKER: Status: ACTIVE | Noted: 2019-10-29

## 2019-10-29 RX ORDER — SIMVASTATIN 40 MG/1
TABLET, FILM COATED ORAL
Refills: 0 | Status: ACTIVE | COMMUNITY

## 2019-10-29 RX ORDER — DEXAMETHASONE 6 MG/1
TABLET ORAL
Refills: 0 | Status: ACTIVE | COMMUNITY

## 2019-10-29 RX ORDER — PANTOPRAZOLE SODIUM 40 MG/1
40 TABLET, DELAYED RELEASE ORAL
Refills: 0 | Status: ACTIVE | COMMUNITY

## 2019-10-29 RX ORDER — ATORVASTATIN CALCIUM 80 MG/1
TABLET, FILM COATED ORAL
Refills: 0 | Status: ACTIVE | COMMUNITY

## 2019-10-30 NOTE — HISTORY OF PRESENT ILLNESS
[FreeTextEntry1] : Mr. DANIEL MUHAMMAD is a 58 yo LH male with PMH of HTN, HLD who initially seen in [facility] on 9/9/19 for neck and upper back pain and b/l arms burning/tingling pain with weakness after mechanical fall (slipped and fell hitting upper back on the bath tub) on the same day. Initial MRI C-spine revealed multilevel spondylosis with cord compression. 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 On the first post op visit, he was doing well and plan was made to return to one month and continue home exercise for deltoid muscle strengthening and bone growth stimulator. \par Today he returns for follow up and states his right arm weakness slightly improved but c/o persistent neck tightness with radiating pain/numbness on left side arm/hand which causing difficulty in writing. He denies fever/chills, balance problem, bowel/urinary dysfunction. Additionally he has chronic back pain radiating to right LE. He ambulates without assistive device and able to drive by himself without difficulty. \par Surgical incision appear to be healed well.

## 2019-10-30 NOTE — ASSESSMENT
[FreeTextEntry1] : This is a 58 yo male with severe multiple stenosis who is s/p C3-C7 ACDF ~1.5 months ago. His right deltoid weakness/Left hand numbness/neck pain radiating to L arm has been improving, now abducting to 80 degrees with good muscle contraction of deltoid, and neuro exam is otherwise unremarkable. 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 
Yes

## 2019-10-30 NOTE — REVIEW OF SYSTEMS
[Arm Weakness] : arm weakness [Numbness] : numbness [As Noted in HPI] : as noted in HPI [Negative] : Endocrine [FreeTextEntry9] : posterior neck tightness, lower back pain radiating to left LE

## 2019-10-30 NOTE — PHYSICAL EXAM
[General Appearance - Alert] : alert [General Appearance - In No Acute Distress] : in no acute distress [General Appearance - Well Nourished] : well nourished [General Appearance - Well-Appearing] : healthy appearing [Transverse] : transverse [Clean] : clean [Dry] : dry [Well-Healed] : well-healed [No Drainage] : without drainage [Normal Skin] : normal [Oriented To Time, Place, And Person] : oriented to person, place, and time [Impaired Insight] : insight and judgment were intact [Affect] : the affect was normal [Memory Recent] : recent memory was not impaired [Person] : oriented to person [Place] : oriented to place [Time] : oriented to time [Short Term Intact] : short term memory intact [Remote Intact] : remote memory intact [Registration Intact] : recent registration memory intact [Span Intact] : the attention span was normal [Concentration Intact] : normal concentrating ability [Fluency] : fluency intact [Comprehension] : comprehension intact [Reading] : reading intact [Current Events] : adequate knowledge of current events [Past History] : adequate knowledge of personal past history [Vocabulary] : adequate range of vocabulary [Cranial Nerves Optic (II)] : visual acuity intact bilaterally,  pupils equal round and reactive to light [Cranial Nerves Oculomotor (III)] : extraocular motion intact [Cranial Nerves Trigeminal (V)] : facial sensation intact symmetrically [Cranial Nerves Facial (VII)] : face symmetrical [Cranial Nerves Vestibulocochlear (VIII)] : hearing was intact bilaterally [Cranial Nerves Accessory (XI - Cranial And Spinal)] : head turning and shoulder shrug symmetric [Cranial Nerves Hypoglossal (XII)] : there was no tongue deviation with protrusion [Motor Tone] : muscle tone was normal in all four extremities [3] : C5 deltoid 3/5 [5] : S1 toe walking 5/5 [Dysesthesia] : dysesthesia was present [Balance] : balance was intact [2+] : Patella left 2+ [No Visual Abnormalities] : no visible abnormailities [Normal] : normal [Able to toe walk] : the patient was able to toe walk [Able to heel walk] : the patient was able to heel walk [PERRL With Normal Accommodation] : pupils were equal in size, round, reactive to light, with normal accommodation [Extraocular Movements] : extraocular movements were intact [Outer Ear] : the ears and nose were normal in appearance [Hearing Threshold Finger Rub Not Edgar] : hearing was normal [Examination Of The Oral Cavity] : the lips and gums were normal [] : no respiratory distress [Exaggerated Use Of Accessory Muscles For Inspiration] : no accessory muscle use [Edema] : there was no peripheral edema [No CVA Tenderness] : no ~M costovertebral angle tenderness [Abnormal Walk] : normal gait [Erythema] : not erythematous [Tender] : not tender [Warm] : not warm [Indurated] : not indurated [Fluctuant] : not fluctuant [Romberg's Sign] : Romberg's sign was negtive [Bo] : Bo's sign was not demonstrated [Straight-Leg Raise Test - Left] : straight leg raise of the left leg was negative [Straight-Leg Raise Test - Right] : straight leg raise  of the right leg was negative [FreeTextEntry1] : mild tenderness on posterior neck

## 2019-11-01 NOTE — PATIENT PROFILE ADULT - PATIENT REPRESENTATIVE: ( YOU CAN CHOOSE ANY PERSON THAT CAN ASSIST YOU WITH YOUR HEALTH CARE PREFERENCES, DOES NOT HAVE TO BE A SPOUSE, IMMEDIATE FAMILY OR SIGNIFICANT OTHER/PARTNER)
CHIEF COMPLAINT    Chief Complaint   Patient presents with   • Nasal Drainage       HPI    Pineda Rg is a 21 year old male who presents with complaints of intermittent nasal drainage out of the left nostril for the past year. Patient notes that the drainage is a yellow/clear liquid that is sticky, but patient states that it is not similar to mucous. Patient reports that these episodes happen \"once every month or two\". Patient notes that his last episode of this was earlier today. He reports that he usually doesn't do anything for his symptoms. Patient states that \"it keeps happening, and I looked up my symptoms on the Internet\". Patient is concerned that he is leaking CSF. Additionally, patient endorses some epistaxis earlier today.     Patient denies any fever, chills, headaches, neck pain, ear pain, cough, congestion, or regular medication use. Additionally, patient denies any major head trauma in the past.     Allergies     ALLERGIES:  No Known Allergies    Current Medications   No current facility-administered medications for this encounter.      Current Outpatient Medications   Medication Sig Dispense Refill   • mometasone (NASONEX) 50 MCG/ACT nasal spray Spray 2 sprays in each nostril daily for 14 days. 17 g 1       Past Medical History    Past Medical History:   Diagnosis Date   • Allergic rhinitis        Surgical History    Past Surgical History:   Procedure Laterality Date   • Adenoidectomy w/ myringotomy and tubes     • Percut fix distal rad epiphyseal fx  07/30/2012   • Remove tonsils/adenoids,<13 y/o  07/14/2006        • Tonsillectomy and adenoidectomy         Social History    Social History     Tobacco Use   • Smoking status: Never Smoker   • Smokeless tobacco: Never Used   Substance Use Topics   • Alcohol use: Yes     Comment: occasionally   • Drug use: No       Family History    History reviewed. No pertinent family history.    REVIEW OF SYSTEMS    ALL 13 SYSTEMS REVIEWED AND NEGATIVE OR  NONCONTRIBUTORY UNLESS OTHERWISE NOTED IN HPI      PHYSICAL EXAM     ED Triage Vitals [10/31/19 2320]   ED Triage Vitals Group      Temp 98.6 °F (37 °C)      Pulse 80      Resp 18      BP (!) 142/97      SpO2 98 %      EtCO2 mmHg       Height 6' 1\" (1.854 m)      Weight 218 lb 7.6 oz (99.1 kg)      Weight Scale Used ED Actual       Gen:   AAOx3 in NAD  Head:  Normocephalic, without abnormal findings  HEENT:   PERRL, EOMI without Nystagmus. Sclera anicteric   Nose:  Dried blood in left nares with mild mucosal edema.   Mouth: Patent without swelling.  Moist well perfused mucous membranes   Ears: Bilateral TMs Normal  Neck: No masses, thyromegaly, posterior tenderness or meningeal signs  Ext:  No clubbing, cyanosis, or significant edema.  Equal UE/LE pulses. No joint swelling or erythema  Skin:  Well perfused, no significant rashes. No jaundice  Lymph:No significant Lymphadenopathy  Psych:Alert and interactive with normal affect and interaction.      Procedures      MDM  Diagnosis:  ED Diagnosis        Final diagnosis    Chronic rhinitis                     Summary of your Discharge Medications      Take these Medications      Details   mometasone 50 MCG/ACT nasal spray  Commonly known as:  NASONEX   Spray 2 sprays in each nostril daily for 14 days.            Follow Up:  Yusef Petty MD  [address] [id number] [phone]           Patient was instructed to return to the ED immediately if symptoms worsen or any new unusual symptoms arise.     Bora Samayoa MD     Recheck on patient. Discussed with patient ED findings and plan for discharge. Patient was given ED warnings, discharge instructions, and follow up information to go home with. Patient understands and agrees with plan for discharge. Any questions have been answered.      Closure:  The patient understands that this is a provisional diagnosis. Provisional diagnosis can and do change. The diagnosis that you are discharged  with today is based on the symptoms with which you presented today. If any new symptoms occur or worsen, you should seek immediate attention for re-evaluation.  Any symptoms that persist or fail to completely resolve require further evaluation by your other healthcare provider(s).      This chart was documented by Timmy Houser, acting as a scribe for Bora Samayoa MD. 10/31/2019, 11:21 PM.   The documentation recorded by the scribe accurately and completely reflects the service(s) I personally performed and the decisions made by me.       Bora Samayoa MD  11/01/19 0541     yes

## 2019-12-16 ENCOUNTER — OUTPATIENT (OUTPATIENT)
Dept: OUTPATIENT SERVICES | Facility: HOSPITAL | Age: 59
LOS: 1 days | End: 2019-12-16
Payer: COMMERCIAL

## 2019-12-16 ENCOUNTER — APPOINTMENT (OUTPATIENT)
Dept: NEUROSURGERY | Facility: CLINIC | Age: 59
End: 2019-12-16
Payer: COMMERCIAL

## 2019-12-16 VITALS
TEMPERATURE: 97.4 F | HEIGHT: 71 IN | RESPIRATION RATE: 18 BRPM | WEIGHT: 180 LBS | OXYGEN SATURATION: 99 % | DIASTOLIC BLOOD PRESSURE: 84 MMHG | SYSTOLIC BLOOD PRESSURE: 132 MMHG | BODY MASS INDEX: 25.2 KG/M2 | HEART RATE: 67 BPM

## 2019-12-16 DIAGNOSIS — M47.12 OTHER SPONDYLOSIS WITH MYELOPATHY, CERVICAL REGION: ICD-10-CM

## 2019-12-16 DIAGNOSIS — M54.41 LUMBAGO WITH SCIATICA, RIGHT SIDE: ICD-10-CM

## 2019-12-16 DIAGNOSIS — G89.29 LUMBAGO WITH SCIATICA, RIGHT SIDE: ICD-10-CM

## 2019-12-16 PROCEDURE — 72040 X-RAY EXAM NECK SPINE 2-3 VW: CPT

## 2019-12-16 PROCEDURE — 99024 POSTOP FOLLOW-UP VISIT: CPT

## 2019-12-16 PROCEDURE — 72040 X-RAY EXAM NECK SPINE 2-3 VW: CPT | Mod: 26

## 2019-12-17 NOTE — REVIEW OF SYSTEMS
[Arm Weakness] : arm weakness [Hand Weakness] :  hand weakness [Tingling] : tingling [Numbness] : numbness [Negative] : Endocrine

## 2019-12-24 NOTE — PHYSICAL EXAM
[General Appearance - In No Acute Distress] : in no acute distress [General Appearance - Alert] : alert [General Appearance - Well Nourished] : well nourished [General Appearance - Well-Appearing] : healthy appearing [Clean] : clean [Transverse] : transverse [Well-Healed] : well-healed [Dry] : dry [No Drainage] : without drainage [Normal Skin] : normal [Impaired Insight] : insight and judgment were intact [Oriented To Time, Place, And Person] : oriented to person, place, and time [Affect] : the affect was normal [Memory Recent] : recent memory was not impaired [Person] : oriented to person [Place] : oriented to place [Time] : oriented to time [Cranial Nerves Oculomotor (III)] : extraocular motion intact [Cranial Nerves Optic (II)] : visual acuity intact bilaterally,  pupils equal round and reactive to light [Cranial Nerves Trigeminal (V)] : facial sensation intact symmetrically [Cranial Nerves Vestibulocochlear (VIII)] : hearing was intact bilaterally [Cranial Nerves Accessory (XI - Cranial And Spinal)] : head turning and shoulder shrug symmetric [Cranial Nerves Facial (VII)] : face symmetrical [Cranial Nerves Hypoglossal (XII)] : there was no tongue deviation with protrusion [Sensation Tactile Decrease] : light touch was intact [Sensation Pain / Temperature Decrease] : pain and temperature was intact [Balance] : balance was intact [2+] : Ankle jerk left 2+ [Full ROM] : full ROM [No Pain with ROM] : no pain with motion in any direction [Sclera] : the sclera and conjunctiva were normal [PERRL With Normal Accommodation] : pupils were equal in size, round, reactive to light, with normal accommodation [Full Visual Field] : full visual field [Exaggerated Use Of Accessory Muscles For Inspiration] : no accessory muscle use [] : no respiratory distress [Respiration, Rhythm And Depth] : normal respiratory rhythm and effort [Abnormal Walk] : normal gait [Motor Tone] : muscle strength and tone were normal [Involuntary Movements] : no involuntary movements were seen [FreeTextEntry6] : Right deltoid 5/5.  CAn abduct right arm to 120 deg.

## 2019-12-24 NOTE — HISTORY OF PRESENT ILLNESS
[FreeTextEntry1] : Mr. DANIEL MUHAMMAD is a 60 yo LH male with PMH of HTN, HLD who initially seen in [facility] on 9/9/19 for neck and upper back pain and b/l arms burning/tingling pain with weakness after mechanical fall (slipped and fell hitting upper back on the bath tub) on the same day. Initial MRI C-spine revealed multilevel spondylosis with cord compression. 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 Today he is here for the 3rd post op visit.\par Today Mr. MUHAMMAD. present ambulatory  believes that he improved over all.  His Surgical incision appear to be healed well. He  states his right arm weakness slightly improved. He reports some  neck tightness with radiating pain/numbness on left side arm and hand, but it is  getting better. He is  able to write well now, a huge improvement from last time. He denies fever/chills, balance problem, bowel/urinary dysfunction. Additionally he has chronic back pain radiating to right LE. He ambulates without assistive device and able to drive by himself without difficulty.  He still continue home exercise for deltoid muscle strengthening and bone growth stimulator. \par

## 2019-12-24 NOTE — ASSESSMENT
[FreeTextEntry1] : Impression: \par  s/p C3-C7 ACDF  right deltoid weakness/Left hand numbness/neck pain radiating to L arm has been improving, today full ROM for right deltoid.\par \par Plan: \par PT neck 2-3 times a week for 8 weeks\par PT back 2-3 times a week for 8 weeks\par X ray cervical AP/LAT in 3 months\par Advised certain time frames for activities, 3 more months for heavy lifting and  6 more months for bicycling.\par Follow up  in 3 months after Xray.\par

## 2020-01-07 NOTE — ED ADULT NURSE NOTE - LOCATION
back Chonodrocutaneous Helical Advancement Flap Text: The defect edges were debeveled with a #15 scalpel blade.  Given the location of the defect and the proximity to free margins a chondrocutaneous helical advancement flap was deemed most appropriate.  Using a sterile surgical marker, the appropriate advancement flap was drawn incorporating the defect and placing the expected incisions within the relaxed skin tension lines where possible.    The area thus outlined was incised deep to adipose tissue with a #15 scalpel blade.  The skin margins were undermined to an appropriate distance in all directions utilizing iris scissors.

## 2020-03-09 ENCOUNTER — OUTPATIENT (OUTPATIENT)
Dept: OUTPATIENT SERVICES | Facility: HOSPITAL | Age: 60
LOS: 1 days | End: 2020-03-09
Payer: COMMERCIAL

## 2020-03-09 ENCOUNTER — APPOINTMENT (OUTPATIENT)
Dept: NEUROSURGERY | Facility: CLINIC | Age: 60
End: 2020-03-09
Payer: COMMERCIAL

## 2020-03-09 VITALS
HEART RATE: 70 BPM | WEIGHT: 180 LBS | SYSTOLIC BLOOD PRESSURE: 122 MMHG | OXYGEN SATURATION: 100 % | HEIGHT: 71 IN | BODY MASS INDEX: 25.2 KG/M2 | DIASTOLIC BLOOD PRESSURE: 82 MMHG | TEMPERATURE: 97.9 F | RESPIRATION RATE: 16 BRPM

## 2020-03-09 DIAGNOSIS — M47.12 OTHER SPONDYLOSIS WITH MYELOPATHY, CERVICAL REGION: ICD-10-CM

## 2020-03-09 PROCEDURE — 99213 OFFICE O/P EST LOW 20 MIN: CPT

## 2020-03-09 PROCEDURE — 72040 X-RAY EXAM NECK SPINE 2-3 VW: CPT | Mod: 26

## 2020-03-09 PROCEDURE — 72040 X-RAY EXAM NECK SPINE 2-3 VW: CPT

## 2020-06-06 ENCOUNTER — EMERGENCY (EMERGENCY)
Facility: HOSPITAL | Age: 60
LOS: 1 days | Discharge: HOME | End: 2020-06-06
Attending: EMERGENCY MEDICINE
Payer: COMMERCIAL

## 2020-06-06 VITALS
RESPIRATION RATE: 20 BRPM | HEART RATE: 78 BPM | SYSTOLIC BLOOD PRESSURE: 164 MMHG | OXYGEN SATURATION: 97 % | TEMPERATURE: 99 F | DIASTOLIC BLOOD PRESSURE: 94 MMHG | WEIGHT: 179.02 LBS | HEIGHT: 71 IN

## 2020-06-06 VITALS
HEART RATE: 79 BPM | SYSTOLIC BLOOD PRESSURE: 146 MMHG | TEMPERATURE: 98 F | OXYGEN SATURATION: 99 % | DIASTOLIC BLOOD PRESSURE: 98 MMHG | RESPIRATION RATE: 8 BRPM

## 2020-06-06 LAB
ALBUMIN SERPL ELPH-MCNC: 4.8 G/DL — SIGNIFICANT CHANGE UP (ref 3.3–5)
ALP SERPL-CCNC: 123 U/L — HIGH (ref 40–120)
ALT FLD-CCNC: 30 U/L — SIGNIFICANT CHANGE UP (ref 10–45)
ANION GAP SERPL CALC-SCNC: 11 MMOL/L — SIGNIFICANT CHANGE UP (ref 5–17)
APPEARANCE UR: CLEAR — SIGNIFICANT CHANGE UP
AST SERPL-CCNC: 31 U/L — SIGNIFICANT CHANGE UP (ref 10–40)
BACTERIA # UR AUTO: NEGATIVE — SIGNIFICANT CHANGE UP
BASE EXCESS BLDV CALC-SCNC: 3 MMOL/L — HIGH (ref -2–2)
BASOPHILS # BLD AUTO: 0.05 K/UL — SIGNIFICANT CHANGE UP (ref 0–0.2)
BASOPHILS NFR BLD AUTO: 0.5 % — SIGNIFICANT CHANGE UP (ref 0–2)
BILIRUB SERPL-MCNC: 0.2 MG/DL — SIGNIFICANT CHANGE UP (ref 0.2–1.2)
BILIRUB UR-MCNC: NEGATIVE — SIGNIFICANT CHANGE UP
BUN SERPL-MCNC: 17 MG/DL — SIGNIFICANT CHANGE UP (ref 7–23)
CA-I SERPL-SCNC: 1.28 MMOL/L — SIGNIFICANT CHANGE UP (ref 1.12–1.3)
CALCIUM SERPL-MCNC: 9.8 MG/DL — SIGNIFICANT CHANGE UP (ref 8.4–10.5)
CHLORIDE BLDV-SCNC: 105 MMOL/L — SIGNIFICANT CHANGE UP (ref 96–108)
CHLORIDE SERPL-SCNC: 104 MMOL/L — SIGNIFICANT CHANGE UP (ref 96–108)
CO2 BLDV-SCNC: 33 MMOL/L — HIGH (ref 22–30)
CO2 SERPL-SCNC: 26 MMOL/L — SIGNIFICANT CHANGE UP (ref 22–31)
COLOR SPEC: SIGNIFICANT CHANGE UP
CREAT SERPL-MCNC: 1.34 MG/DL — HIGH (ref 0.5–1.3)
DIFF PNL FLD: ABNORMAL
EOSINOPHIL # BLD AUTO: 0.1 K/UL — SIGNIFICANT CHANGE UP (ref 0–0.5)
EOSINOPHIL NFR BLD AUTO: 1 % — SIGNIFICANT CHANGE UP (ref 0–6)
EPI CELLS # UR: 0 /HPF — SIGNIFICANT CHANGE UP
GAS PNL BLDV: 141 MMOL/L — SIGNIFICANT CHANGE UP (ref 135–145)
GAS PNL BLDV: SIGNIFICANT CHANGE UP
GLUCOSE BLDV-MCNC: 97 MG/DL — SIGNIFICANT CHANGE UP (ref 70–99)
GLUCOSE SERPL-MCNC: 101 MG/DL — HIGH (ref 70–99)
GLUCOSE UR QL: NEGATIVE — SIGNIFICANT CHANGE UP
HCO3 BLDV-SCNC: 31 MMOL/L — HIGH (ref 21–29)
HCT VFR BLD CALC: 46.2 % — SIGNIFICANT CHANGE UP (ref 39–50)
HCT VFR BLDA CALC: 48 % — SIGNIFICANT CHANGE UP (ref 39–50)
HGB BLD CALC-MCNC: 15.6 G/DL — SIGNIFICANT CHANGE UP (ref 13–17)
HGB BLD-MCNC: 14.7 G/DL — SIGNIFICANT CHANGE UP (ref 13–17)
IMM GRANULOCYTES NFR BLD AUTO: 0.2 % — SIGNIFICANT CHANGE UP (ref 0–1.5)
KETONES UR-MCNC: NEGATIVE — SIGNIFICANT CHANGE UP
LACTATE BLDV-MCNC: 2.1 MMOL/L — HIGH (ref 0.7–2)
LACTATE BLDV-MCNC: 2.2 MMOL/L — HIGH (ref 0.7–2)
LEUKOCYTE ESTERASE UR-ACNC: NEGATIVE — SIGNIFICANT CHANGE UP
LYMPHOCYTES # BLD AUTO: 1.36 K/UL — SIGNIFICANT CHANGE UP (ref 1–3.3)
LYMPHOCYTES # BLD AUTO: 13.9 % — SIGNIFICANT CHANGE UP (ref 13–44)
MCHC RBC-ENTMCNC: 25.8 PG — LOW (ref 27–34)
MCHC RBC-ENTMCNC: 31.8 GM/DL — LOW (ref 32–36)
MCV RBC AUTO: 81.2 FL — SIGNIFICANT CHANGE UP (ref 80–100)
MONOCYTES # BLD AUTO: 0.77 K/UL — SIGNIFICANT CHANGE UP (ref 0–0.9)
MONOCYTES NFR BLD AUTO: 7.9 % — SIGNIFICANT CHANGE UP (ref 2–14)
NEUTROPHILS # BLD AUTO: 7.47 K/UL — HIGH (ref 1.8–7.4)
NEUTROPHILS NFR BLD AUTO: 76.5 % — SIGNIFICANT CHANGE UP (ref 43–77)
NITRITE UR-MCNC: NEGATIVE — SIGNIFICANT CHANGE UP
NRBC # BLD: 0 /100 WBCS — SIGNIFICANT CHANGE UP (ref 0–0)
PCO2 BLDV: 62 MMHG — HIGH (ref 35–50)
PH BLDV: 7.32 — LOW (ref 7.35–7.45)
PH UR: 7 — SIGNIFICANT CHANGE UP (ref 5–8)
PLATELET # BLD AUTO: 182 K/UL — SIGNIFICANT CHANGE UP (ref 150–400)
PO2 BLDV: 22 MMHG — LOW (ref 25–45)
POTASSIUM BLDV-SCNC: 3.8 MMOL/L — SIGNIFICANT CHANGE UP (ref 3.5–5.3)
POTASSIUM SERPL-MCNC: 4 MMOL/L — SIGNIFICANT CHANGE UP (ref 3.5–5.3)
POTASSIUM SERPL-SCNC: 4 MMOL/L — SIGNIFICANT CHANGE UP (ref 3.5–5.3)
PROT SERPL-MCNC: 7.9 G/DL — SIGNIFICANT CHANGE UP (ref 6–8.3)
PROT UR-MCNC: NEGATIVE — SIGNIFICANT CHANGE UP
RBC # BLD: 5.69 M/UL — SIGNIFICANT CHANGE UP (ref 4.2–5.8)
RBC # FLD: 14.3 % — SIGNIFICANT CHANGE UP (ref 10.3–14.5)
RBC CASTS # UR COMP ASSIST: 4 /HPF — SIGNIFICANT CHANGE UP (ref 0–4)
SAO2 % BLDV: 30 % — LOW (ref 67–88)
SODIUM SERPL-SCNC: 141 MMOL/L — SIGNIFICANT CHANGE UP (ref 135–145)
SP GR SPEC: 1.01 — SIGNIFICANT CHANGE UP (ref 1.01–1.02)
UROBILINOGEN FLD QL: NEGATIVE — SIGNIFICANT CHANGE UP
WBC # BLD: 9.77 K/UL — SIGNIFICANT CHANGE UP (ref 3.8–10.5)
WBC # FLD AUTO: 9.77 K/UL — SIGNIFICANT CHANGE UP (ref 3.8–10.5)
WBC UR QL: 4 /HPF — SIGNIFICANT CHANGE UP (ref 0–5)

## 2020-06-06 PROCEDURE — 85014 HEMATOCRIT: CPT

## 2020-06-06 PROCEDURE — 82330 ASSAY OF CALCIUM: CPT

## 2020-06-06 PROCEDURE — 85027 COMPLETE CBC AUTOMATED: CPT

## 2020-06-06 PROCEDURE — 84295 ASSAY OF SERUM SODIUM: CPT

## 2020-06-06 PROCEDURE — 82947 ASSAY GLUCOSE BLOOD QUANT: CPT

## 2020-06-06 PROCEDURE — 74177 CT ABD & PELVIS W/CONTRAST: CPT | Mod: 26

## 2020-06-06 PROCEDURE — 82435 ASSAY OF BLOOD CHLORIDE: CPT

## 2020-06-06 PROCEDURE — 83605 ASSAY OF LACTIC ACID: CPT

## 2020-06-06 PROCEDURE — 82803 BLOOD GASES ANY COMBINATION: CPT

## 2020-06-06 PROCEDURE — 96361 HYDRATE IV INFUSION ADD-ON: CPT

## 2020-06-06 PROCEDURE — 81001 URINALYSIS AUTO W/SCOPE: CPT

## 2020-06-06 PROCEDURE — 99285 EMERGENCY DEPT VISIT HI MDM: CPT

## 2020-06-06 PROCEDURE — 87086 URINE CULTURE/COLONY COUNT: CPT

## 2020-06-06 PROCEDURE — 96375 TX/PRO/DX INJ NEW DRUG ADDON: CPT

## 2020-06-06 PROCEDURE — 84132 ASSAY OF SERUM POTASSIUM: CPT

## 2020-06-06 PROCEDURE — 74177 CT ABD & PELVIS W/CONTRAST: CPT

## 2020-06-06 PROCEDURE — 99284 EMERGENCY DEPT VISIT MOD MDM: CPT | Mod: 25

## 2020-06-06 PROCEDURE — 80053 COMPREHEN METABOLIC PANEL: CPT

## 2020-06-06 PROCEDURE — 96374 THER/PROPH/DIAG INJ IV PUSH: CPT | Mod: XU

## 2020-06-06 RX ORDER — SODIUM CHLORIDE 9 MG/ML
1000 INJECTION INTRAMUSCULAR; INTRAVENOUS; SUBCUTANEOUS ONCE
Refills: 0 | Status: COMPLETED | OUTPATIENT
Start: 2020-06-06 | End: 2020-06-06

## 2020-06-06 RX ORDER — TAMSULOSIN HYDROCHLORIDE 0.4 MG/1
1 CAPSULE ORAL
Qty: 10 | Refills: 0
Start: 2020-06-06 | End: 2020-06-15

## 2020-06-06 RX ORDER — OXYCODONE HYDROCHLORIDE 5 MG/1
5 TABLET ORAL ONCE
Refills: 0 | Status: DISCONTINUED | OUTPATIENT
Start: 2020-06-06 | End: 2020-06-06

## 2020-06-06 RX ORDER — OXYCODONE HYDROCHLORIDE 5 MG/1
1 TABLET ORAL
Qty: 6 | Refills: 0
Start: 2020-06-06

## 2020-06-06 RX ORDER — MORPHINE SULFATE 50 MG/1
4 CAPSULE, EXTENDED RELEASE ORAL ONCE
Refills: 0 | Status: DISCONTINUED | OUTPATIENT
Start: 2020-06-06 | End: 2020-06-06

## 2020-06-06 RX ORDER — KETOROLAC TROMETHAMINE 30 MG/ML
15 SYRINGE (ML) INJECTION ONCE
Refills: 0 | Status: DISCONTINUED | OUTPATIENT
Start: 2020-06-06 | End: 2020-06-06

## 2020-06-06 RX ORDER — IBUPROFEN 200 MG
1 TABLET ORAL
Qty: 30 | Refills: 0
Start: 2020-06-06

## 2020-06-06 RX ADMIN — OXYCODONE HYDROCHLORIDE 5 MILLIGRAM(S): 5 TABLET ORAL at 21:38

## 2020-06-06 RX ADMIN — SODIUM CHLORIDE 1000 MILLILITER(S): 9 INJECTION INTRAMUSCULAR; INTRAVENOUS; SUBCUTANEOUS at 17:54

## 2020-06-06 RX ADMIN — SODIUM CHLORIDE 1000 MILLILITER(S): 9 INJECTION INTRAMUSCULAR; INTRAVENOUS; SUBCUTANEOUS at 20:30

## 2020-06-06 RX ADMIN — MORPHINE SULFATE 4 MILLIGRAM(S): 50 CAPSULE, EXTENDED RELEASE ORAL at 17:54

## 2020-06-06 RX ADMIN — Medication 15 MILLIGRAM(S): at 19:39

## 2020-06-06 NOTE — ED PROVIDER NOTE - PROGRESS NOTE DETAILS
Attending note (Maverick): patient seen at 1630; documentation/orders delayed due to computer error related to registration, called and spoke with charge rn and registration personnel, and patient re-registered with ability to order and document now available.

## 2020-06-06 NOTE — ED PROVIDER NOTE - CLINICAL SUMMARY MEDICAL DECISION MAKING FREE TEXT BOX
Attending note (Maverick): 59 y/o M, h/o HTN HLD p/w R flank and RLQ pain; tender in RLQ and +R CVA tenderness on exam; concerning for appendicitis vs ureteral stone.  Will obtain labs: cbc, cmp, ua, vbg/lactate, and obtain CT AP (w/ iv contrast to assess for appendicitis, but ureterolithiasis also in differential).  Hemodynamically stable, afebrile, less likely AAA rupture give presentation, and presentation not c/w mesenteric ischemia.  If small renal stone and symptoms contolled, can dc, if appendicitis, will obtain surgical consult and admit.

## 2020-06-06 NOTE — ED ADULT NURSE NOTE - OBJECTIVE STATEMENT
Male 60 years old with past medical history of HTN and high cholesterol came in for right flank pain radiating to right groin intermittent since Monday and got worse today. [facility] and was sent here to r/o kidney stone. Denies fever, chills, nausea, vomiting or blood in urine. With mild burning in urination. No chest pain, sob or sick contacts. Labs obtained. Will continue to reassess.

## 2020-06-06 NOTE — ED PROVIDER NOTE - NSFOLLOWUPINSTRUCTIONS_ED_ALL_ED_FT
Follow up with urology with in the next week. A number for urology has been provided above.    1) Follow up with urology within 1 week.  2) Return to the ER immediately for new or worsening symptoms especially fever > 100.4 F, vomiting, or uncontrolled pain.  3) Take motrin 600 mg every 6 hours for mild to moderate pain.  Take oxycodone 1 tabs every 6 hours for moderate to severe pain.  NO DRIVING OR OPERATING HEAVY MACHINERY WHILE ON oxycodone. Take flomax 0.4 mg daily. Drink plenty of water until urine appears clear.

## 2020-06-06 NOTE — ED PROVIDER NOTE - NSFOLLOWUPCLINICS_GEN_ALL_ED_FT
[facility]  Urology  [address] [place] [address] [address]  [place] [address]  Phone: [phone]  Fax:   Follow Up Time:     [facility]  Urology  [address] [place] [id number]  Phone: [phone]  Fax:   Follow Up Time:

## 2020-06-06 NOTE — ED PROVIDER NOTE - ATTENDING CONTRIBUTION TO CARE
59 y/o M, h/o HTN HLD p/w R flank and RLQ pain; tender in RLQ and +R CVA tenderness on exam; concerning for appendicitis vs ureteral stone.  Will obtain labs: cbc, cmp, ua, vbg/lactate, and obtain CT AP (w/ iv contrast to assess for appendicitis, but ureterolithiasis also in differential).  Hemodynamically stable, afebrile, less likely AAA rupture give presentation, and presentation not c/w mesenteric ischemia.  If small renal stone and symptoms contolled, can dc, if appendicitis, will obtain surgical consult and admit.

## 2020-06-06 NOTE — ED ADULT NURSE NOTE - CHPI ED NUR SYMPTOMS NEG
no abdominal distension/no vomiting/no fever/no hematuria/no nausea/no blood in stool/no chills/no diarrhea/no dysuria

## 2020-06-06 NOTE — ED PROVIDER NOTE - OBJECTIVE STATEMENT
Attending note (Maverick): 59 y/o M with h/o HTN, HLD, c/o RLQ and R flank pain since monday.  Patient states pain was mild, began in right flank, waxing/waning since onset, but now more in the right lower abdomen and more severe/sharp since last night.  No fever/chills, no nausea/vomiting. No difficulty urinating, patient denies seeing blood in urine. No diarrhea.  No cough/congestion/sob.  No CP.  Went to UC which reportedly noted blood in urine on UA (microscopic hematuria?) and sent him to ED for CT scan.  No h/o prior kidney stones.      PSH: no abdominal surgeries  PMH: HTN, HLD  allergies: NKDA  non-smoker

## 2020-06-06 NOTE — ED PROVIDER NOTE - PATIENT PORTAL LINK FT
You can access the FollowMyHealth Patient Portal offered by [facility] by registering at the following website: [email or web address]. By joining [email or web address]’s FollowMyHealth portal, you will also be able to view your health information using other applications (apps) compatible with our system.

## 2020-06-07 LAB
CULTURE RESULTS: NO GROWTH — SIGNIFICANT CHANGE UP
SPECIMEN SOURCE: SIGNIFICANT CHANGE UP

## 2020-06-08 ENCOUNTER — APPOINTMENT (OUTPATIENT)
Dept: NEUROSURGERY | Facility: CLINIC | Age: 60
End: 2020-06-08
Payer: COMMERCIAL

## 2020-06-08 DIAGNOSIS — Z98.890 OTHER SPECIFIED POSTPROCEDURAL STATES: ICD-10-CM

## 2020-06-08 PROCEDURE — 99212 OFFICE O/P EST SF 10 MIN: CPT | Mod: 95

## 2020-06-09 NOTE — ASSESSMENT
[FreeTextEntry1] : Telehealth from 9:44 to 9:54 (10 min)\par \par  Impression: \par  s/p C3- C7 ACDF for  right deltoid weakness/Left hand numbness/neck pain radiating to L arm has been improving, today full ROM for right deltoid with no further weakness of arm.\par \par Plan: \par CT cervical spine no contrast in September\par Continue bone stimulator up to 9 months after surgery\par Will do authorization for imaging\par Follow up after scan in September\par \par

## 2020-06-09 NOTE — HISTORY OF PRESENT ILLNESS
[Home] : at home, [unfilled] , at the time of the visit. [Other Location: e.g. Home (Enter Location, City,State)___] : at [unfilled] [Other:____] : [unfilled] [Verbal consent obtained from patient] : the patient, [unfilled] [FreeTextEntry1] : Mr. DANIEL MUHAMMAD is a 58 yo LH male with PMH of HTN, HLD who initially seen in [facility] on 9/9/19 for neck and upper back pain and b/l arms burning/tingling pain with weakness after mechanical fall (slipped and fell hitting upper back on the bath tub) on the same day. Initial MRI C-spine revealed multilevel spondylosis with cord compression. 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 \par \par Today he presented on telehealth from home,  reports that he is getting a lot better. No pain, no numbness or tinging in arms, arm is in full function with no restriction. He was using bone growth stimulator and currently stopped. He was in the hospital last weekend due to kidney stone. But he has no bothering symptoms at this time.\par

## 2020-06-09 NOTE — PHYSICAL EXAM
[General Appearance - Alert] : alert [General Appearance - In No Acute Distress] : in no acute distress [General Appearance - Well Nourished] : well nourished [General Appearance - Well-Appearing] : healthy appearing [Oriented To Time, Place, And Person] : oriented to person, place, and time [Affect] : the affect was normal [Person] : oriented to person [Place] : oriented to place [Time] : oriented to time [Abnormal Walk] : normal gait [Involuntary Movements] : no involuntary movements were seen [FreeTextEntry6] : Right arm ROM full

## 2020-06-26 ENCOUNTER — TRANSCRIPTION ENCOUNTER (OUTPATIENT)
Age: 60
End: 2020-06-26

## 2020-08-26 ENCOUNTER — TRANSCRIPTION ENCOUNTER (OUTPATIENT)
Age: 60
End: 2020-08-26

## 2020-09-17 ENCOUNTER — TRANSCRIPTION ENCOUNTER (OUTPATIENT)
Age: 60
End: 2020-09-17

## 2020-09-17 NOTE — HISTORY OF PRESENT ILLNESS
[FreeTextEntry1] : Mr. DANIEL MUHAMMAD is a 58 yo LH male with PMH of HTN, HLD who initially seen in [facility] on 9/9/19 for neck and upper back pain and b/l arms burning/tingling pain with weakness after mechanical fall (slipped and fell hitting upper back on the bath tub) on the same day. Initial MRI C-spine revealed multilevel spondylosis with cord compression. 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 Advised to do CT C spine in September, which was denied and now an appeal is being done.\par

## 2020-09-18 ENCOUNTER — APPOINTMENT (OUTPATIENT)
Dept: NEUROSURGERY | Facility: CLINIC | Age: 60
End: 2020-09-18

## 2021-01-01 NOTE — PHYSICAL THERAPY INITIAL EVALUATION ADULT - ASR EQUIP NEEDS DISCH PT EVAL
Problem: Physiological Stability  Goal: Vital signs and physical assessments stable and within expected parameters  Outcome: Outcome Not Met, Continue to Monitor  Goal: Remains free of signs and symptoms of infection  Outcome: Outcome Not Met, Continue to Monitor  Goal: Parent / caregiver verbalizes understanding of NICU care related to patient-specific condition and treatment  Description: Document on Patient Education Activity  Outcome: Outcome Not Met, Continue to Monitor  Goal: Parent / caregiver verbalizes / demonstrates comfort with their ability to care for infant and familiarity with community resources prior to discharge  Description: Document on Patient Education Activity  Outcome: Outcome Not Met, Continue to Monitor     Problem: Thermoregulation  Goal: Body temperature maintained within normal range  Outcome: Outcome Not Met, Continue to Monitor     Problem: Pain  Goal: Acceptable level of comfort exhibited by infant (based on N-Pass/NIPS Scoring)  Outcome: Outcome Not Met, Continue to Monitor     Problem: Oxygenation/Respiratory Function  Goal: Optimized respiratory function and gas exchange  Outcome: Outcome Not Met, Continue to Monitor  Goal: Parent / caregiver verbalizes understanding of infant's respiratory condition and treatment  Description: Document on Patient Education Activity  Outcome: Outcome Not Met, Continue to Monitor     Problem: Skin Integrity  Goal: Skin integrity is maintained or improved (skin will be intact without erythma or breakdown)  Outcome: Outcome Not Met, Continue to Monitor     Problem: Alteration in Family Bonding  Goal: Family Interaction is supported  Outcome: Outcome Not Met, Continue to Monitor  Goal: Family demonstrates appropriate coping mechanisms  Outcome: Outcome Not Met, Continue to Monitor     Problem: Nutrition  Goal: Tolerates feedings  Outcome: Outcome Not Met, Continue to Monitor  Goal: Consumes sufficient dietary intake without complications  Outcome:  Outcome Not Met, Continue to Monitor  Goal: Progresses toward ability to receive all feeding from breast or bottle  Outcome: Outcome Not Met, Continue to Monitor  Goal: Achieves catch up weight gain and growth consistent with birth weight percentile  Outcome: Outcome Not Met, Continue to Monitor  Goal: Parent/ caregiver verbalizes understanding of milk preparation,  storage and bottle feeding techniques  Description: Document on Patient Education Activity  Outcome: Outcome Not Met, Continue to Monitor     Problem: Breastfeeding  Goal: Breast milk supply is established and maintained to provide breast milk to infant in accordance with mother's preference  Outcome: Outcome Not Met, Continue to Monitor  Goal: Successful breastfeeding as evidenced by proper latch and adequate suck/ swallow  Outcome: Outcome Not Met, Continue to Monitor  Goal: Parent / caregiver verbalizes understanding of benefits of exclusive breastfeeding and breastfeeding techniques  Description: Document on Patient Education Activity  Outcome: Outcome Not Met, Continue to Monitor     Problem: Risk of Necrotizing Enterocolitis (NEC)  Goal: GI and behavioral assessments within expected parameters  Outcome: Outcome Not Met, Continue to Monitor  Goal: Parent / caregiver verbalizes understanding of signs and symptoms and treatment of NEC  Description: Document on Patient Education Activity  Outcome: Outcome Not Met, Continue to Monitor     Problem: Risk of altered growth and development secondary to gestational age or condition  Goal: Demonstrates improved/ appropriate age-corrected neurobehavioral competence at time of discharge or will be referred for ongoing therapy  Outcome: Outcome Not Met, Continue to Monitor  Goal: Parent / caregiver verbalizes understanding of developmentally appropriate interaction & environment  Description: Document on Patient Education Activity  Outcome: Outcome Not Met, Continue to Monitor  Goal: Parent / caregiver  verbalizes understanding of risk for RSV and prevention  Description: Document on Patient Education Activity  Outcome: Outcome Not Met, Continue to Monitor     Problem: Hyperbilirubinemia Requiring Phototherapy  Goal: Infant will receive sufficient treatment to produce a decrease in total serum bilirubin  Outcome: Outcome Not Met, Continue to Monitor  Goal: Parent/ caregiver will verbalize understanding of hyperbilirubinemia and treatment  Description: Document on Patient Education Activity  Outcome: Outcome Not Met, Continue to Monitor      no DME

## 2022-02-15 NOTE — ED ADULT NURSE NOTE - SUICIDE SCREENING QUESTION 1
Cross coverage note for [facility]  ADDICTION MEDICINE FOLLOW UP VISIT    Patient: Leon Alab Consultant: Bud Ruth MD   : 1992 Age: 29 year old   Date: February 15, Bud Ruth MD  [address] [place] [address]  Phone: [phone]  Fax: [phone]   No

## 2022-04-25 NOTE — PRE-OP CHECKLIST - BP NONINVASIVE DIASTOLIC (MM HG)
80 Pt scheduled for surgery  Right Breast Excisional Biopsy with magseed Localization 1 site with Dr Mendenhall tentatively 5/2/22and preop instructions including instructions for taking Famotidine and for Chlorhexidine use in showering on the day of surgery, given verbally and with use of  written materials, and patient confirming understanding of such instructions using  teach back method.

## 2022-09-14 NOTE — PATIENT PROFILE ADULT - NSPROGENBLOODRESTRICT_GEN_A_NUR
nasal sprays such as:  Flonase (fluticasone), Nasacort, Rhinocort.  Use daily for a period of time to calm down the allergy reaction in the nose.    If you have allergy eyes symptoms, use ketotifen eyedrops. Brand names are Zaditor and Alaway     none

## 2023-11-12 ENCOUNTER — EMERGENCY (EMERGENCY)
Facility: HOSPITAL | Age: 63
LOS: 0 days | Discharge: HOME | End: 2023-11-13
Attending: STUDENT IN AN ORGANIZED HEALTH CARE EDUCATION/TRAINING PROGRAM
Payer: COMMERCIAL

## 2023-11-12 VITALS
SYSTOLIC BLOOD PRESSURE: 143 MMHG | OXYGEN SATURATION: 97 % | DIASTOLIC BLOOD PRESSURE: 90 MMHG | HEART RATE: 72 BPM | RESPIRATION RATE: 18 BRPM | TEMPERATURE: 98 F | HEIGHT: 71 IN | WEIGHT: 179.9 LBS
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
## 2023-11-12 DIAGNOSIS — S80.212A ABRASION, LEFT KNEE, INITIAL ENCOUNTER: ICD-10-CM
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
## 2023-11-12 DIAGNOSIS — S80.211A ABRASION, RIGHT KNEE, INITIAL ENCOUNTER: ICD-10-CM

## 2023-11-12 DIAGNOSIS — M54.2 CERVICALGIA: ICD-10-CM

## 2023-11-12 DIAGNOSIS — S00.81XA ABRASION OF OTHER PART OF HEAD, INITIAL ENCOUNTER: ICD-10-CM

## 2023-11-12 PROCEDURE — 99285 EMERGENCY DEPT VISIT HI MDM: CPT

## 2023-11-12 NOTE — ED ADULT NURSE NOTE - CHIEF COMPLAINT QUOTE
PMH of HTN, HLD  C/o fall 1hr ago. Reports he was running, tripped and fall on the road, face down, denies LOC, n/v. Noted road rash, swelling on forehead, abrasion on b/l knee. No active bleeding noted. Not taking any blood thinners. Complaining of mild headache, neck stiffness, b/l knee pain. Tetanus vaccine utd. Speaks full complete sentences, unlabored breathing on RA. Ambulatory with steady gait.

## 2023-11-12 NOTE — ED ADULT NURSE NOTE - NSFALLHARMRISKINTERV_ED_ALL_ED
Communicate risk of Fall with Harm to all staff, patient, and family/Provide visual cue: red socks, yellow wristband, yellow gown, etc/Reinforce activity limits and safety measures with patient and family/Bed in lowest position, wheels locked, appropriate side rails in place/Call bell, personal items and telephone in reach/Instruct patient to call for assistance before getting out of bed/chair/stretcher/Non-slip footwear applied when patient is off stretcher/[place] to call system/Physically safe environment - no spills, clutter or unnecessary equipment/Purposeful Proactive Rounding/Room/bathroom lighting operational, light cord in reach Communicate risk of Fall with Harm to all staff, patient, and family/Provide visual cue: red socks, yellow wristband, yellow gown, etc/Reinforce activity limits and safety measures with patient and family/Bed in lowest position, wheels locked, appropriate side rails in place/Call bell, personal items and telephone in reach/Instruct patient to call for assistance before getting out of bed/chair/stretcher/Non-slip footwear applied when patient is off stretcher/[place] to call system/Physically safe environment - no spills, clutter or unnecessary equipment/Purposeful Proactive Rounding/Room/bathroom lighting operational, light cord in reach Communicate risk of Fall with Harm to all staff, patient, and family/Provide visual cue: red socks, yellow wristband, yellow gown, etc/Reinforce activity limits and safety measures with patient and family/Bed in lowest position, wheels locked, appropriate side rails in place/Call bell, personal items and telephone in reach/Instruct patient to call for assistance before getting out of bed/chair/stretcher/Non-slip footwear applied when patient is off stretcher/[place] to call system/Physically safe environment - no spills, clutter or unnecessary equipment/Purposeful Proactive Rounding/Room/bathroom lighting operational, light cord in reach

## 2023-11-12 NOTE — ED ADULT NURSE NOTE - OBJECTIVE STATEMENT
Pt states he was running and tripped over a sidewalk. States he landed on his knees and forehead. Denies taking blood thinners. Pt noted with abrasions to bilateral knees, forehead and nose. Pt states he was running and tripped over a sidewalk. States he landed on his knees and forehead. Denies taking blood thinners. Pt noted with abrasions to bilateral knees, forehead and nose. Last tetanus shot was 2 years ago.

## 2023-11-12 NOTE — ED ADULT TRIAGE NOTE - CHIEF COMPLAINT QUOTE
PMH of HTN, HLD  C/o fall 1hr ago. Reports he was running, tripped and fall on the road, face down, denies LOC, n/v. Noted abrasions, swelling on forehead, abrasion on b/l knee. No active bleeding noted. Not taking any blood thinners. Complaining of mild headache, neck stiffness, b/l knee pain. Tetanus vaccine utd. Speaks full complete sentences, unlabored breathing on RA. Ambulatory with steady gait. PMH of HTN, HLD  C/o fall 1hr ago. Reports he was running, tripped and fall on the road, face down, denies LOC, n/v. Noted road rash, swelling on forehead, abrasion on b/l knee. No active bleeding noted. Not taking any blood thinners. Complaining of mild headache, neck stiffness, b/l knee pain. Tetanus vaccine utd. Speaks full complete sentences, unlabored breathing on RA. Ambulatory with steady gait.

## 2023-11-13 VITALS
OXYGEN SATURATION: 98 % | SYSTOLIC BLOOD PRESSURE: 125 MMHG | TEMPERATURE: 98 F | HEART RATE: 55 BPM | DIASTOLIC BLOOD PRESSURE: 75 MMHG | RESPIRATION RATE: 16 BRPM

## 2023-11-13 DIAGNOSIS — S00.83XA CONTUSION OF OTHER PART OF HEAD, INITIAL ENCOUNTER: ICD-10-CM

## 2023-11-13 PROCEDURE — 73562 X-RAY EXAM OF KNEE 3: CPT | Mod: 26,50

## 2023-11-13 PROCEDURE — 72125 CT NECK SPINE W/O DYE: CPT | Mod: 26,MA

## 2023-11-13 PROCEDURE — 70450 CT HEAD/BRAIN W/O DYE: CPT | Mod: 26,MA

## 2023-11-13 RX ORDER — ACETAMINOPHEN 500 MG
975 TABLET ORAL ONCE
Refills: 0 | Status: COMPLETED | OUTPATIENT
Start: 2023-11-13 | End: 2023-11-13

## 2023-11-13 RX ORDER — LIDOCAINE 4 G/100G
1 CREAM TOPICAL ONCE
Refills: 0 | Status: COMPLETED | OUTPATIENT
Start: 2023-11-13 | End: 2023-11-13

## 2023-11-13 RX ADMIN — LIDOCAINE 1 PATCH: 4 CREAM TOPICAL at 01:08

## 2023-11-13 RX ADMIN — Medication 975 MILLIGRAM(S): at 01:38

## 2023-11-13 RX ADMIN — Medication 975 MILLIGRAM(S): at 01:08

## 2023-11-13 NOTE — ED PROVIDER NOTE - PATIENT PORTAL LINK FT
You can access the FollowMyHealth Patient Portal offered by [facility] by registering at the following website: [email or web address]. By joining [employer]’s FollowMyHealth portal, you will also be able to view your health information using other applications (apps) compatible with our system. You can access the FollowMyHealth Patient Portal offered by [facility] by registering at the following website: [email or web address]. By joining [employer]’s FollowMyHealth portal, you will also be able to view your health information using other applications (apps) compatible with our system. You can access the FollowMyHealth Patient Portal offered by [facility] by registering at the following website: [email or web address]. By joining [employer]’s FollowMyHealth portal, you will also be able to view your health information using other applications (apps) compatible with our system.

## 2023-11-13 NOTE — ED PROVIDER NOTE - SKIN, MLM
Skin normal color for race, warm, dry and intact. +large forehead abrasion/road rash +bilateral knee abrasion

## 2023-11-13 NOTE — ED PROVIDER NOTE - OBJECTIVE STATEMENT
63 year old male with h/o htn presents today c/o tripping and falling on uneven pavement, pt fell face forward, first landing onto his knees then face, he denies loc, headache or dizziness, pt does not take blood thinners +neck pain +bilateral knee abrasions and tenderness, pt rates his pain a 7/10, tdap utd one year ago

## 2023-11-13 NOTE — ED PROVIDER NOTE - CLINICAL SUMMARY MEDICAL DECISION MAKING FREE TEXT BOX
63 year old male with h/o htn presents today c/o tripping and falling on uneven pavement, pt fell face forward, first landing onto his knees then face, he denies loc, headache or dizziness, pt does not take blood thinners +neck pain +bilateral knee abrasions and tenderness, pt rates his pain a 7/10 63 year old male with h/o htn presents today c/o tripping and falling on uneven pavement, pt fell face forward, first landing onto his knees then face, he denies loc, headache or dizziness, pt does not take blood thinners +neck pain +bilateral knee abrasions and tenderness, pt rates his pain a 7/10, on exam pt has a large area of abrasion/road rash and abrasions to his knees bilaterally, ct and xrays ordered to rule out fracture of bleed

## 2024-02-16 NOTE — ED PROVIDER NOTE - CPE EDP RESP NORM
.Patient Appointment Form:      PCP: no PCP  Referring: Rogelio Nance APRN - CNP    Has the Patient:    Seen a Cardiologist? no    Had a heart catheterization? no    Had heart surgery? no    Had a stress test or nuclear stress test? no    Had an echocardiogram? no    Had a vascular ultrasound? no    Had a 24/48 heart monitor or extended cardiac event monitor? no    Had recent blood work in the last 6 months? no    Had a pacemaker/ICD/ILR implant? no    Seen an Electrophysiologist? no        Will send records via: in Epic      Date & time of appointment:  2/27/2024 1:00PM        normal...

## 2024-08-20 NOTE — H&P PST ADULT - ENMT
Per chart patient followed up today with PCP. ED Navigator to close encounter at this time.     negative detailed exam mouth/pharynx

## 2024-11-05 NOTE — H&P PST ADULT - NS PRO ABUSE SCREEN SUSPICION NEGLECT YN
Health Maintenance       Influenza Vaccine (1)  Overdue since 9/1/2024    COVID-19 Vaccine (4 - Pediatric Pfizer series)  Overdue since 9/1/2024    Well Child Exam 18 Months (Once)  Due since 11/1/2024    Lead Blood/Venous (View Topic Details)  Due since 11/1/2024           Following review of the above:  Pended orders    Note: Refer to final orders and clinician documentation.       no

## 2025-06-17 NOTE — OCCUPATIONAL THERAPY INITIAL EVALUATION ADULT - ADL RETRAINING, OT EVAL
GOAL: Pt will be independent with upper body dressing in 2 weeks. [FreeTextEntry3] : Procedure Note: Musculoskeletal Injection   Diagnosis:   Procedure: right knee superolateral injection  Indication:  The patient has had persistent pain despite conservative treatment.  Risks, benefits and alternatives to procedure were discussed; all questions were answered to the patient's apparent satisfaction and informed consent obtained.  The patient denied prior problems with local anesthetics, injectable cortisones, chicken allergy, coagulopathy and no relevant drug or preservative allergies or sensitivities.  The area of injection was prepared in a sterile fashion.  Prior to injection a 'Time Out' was conducted in accordance with [facility] policy and the site and nature of procedure verified with the patient.   Procedure:   The procedure was carried out utilizing sterile technique from a superolateral arthroscopic portal position.  The needle was placed under ultrasound guidance to improve accuracy and minimize risk to the patient and diagnostic ultrasound in the long and short axis revealed calcified meniscus.    Ultrasound Indication: Obesity   30cc of cloudy fluid was aspirated.   The specimen:   ( ) appeared benign and was discarded  (X) was sent for Cell Count / Gram Stain / Lime / Uric Acid.      Injection into the target area with care taken to aspirate frequently to minimize the risk of intravascular injection was performed with:  ( ) 1cc of Depomedrol (80mg/ml) (X) 2cc of Betamethasone (Celestone) (6mg/ml) ( ) 1cc of Toradol (30mg/ml) (X) 9cc of 0.5% Bupivacaine ( ) 1cc of 1% Lidocaine   Patient tolerated the procedure well and direct pressure was applied for hemostasis. The patient was reminded of potential post-injection risks including, but not limited to, delayed hypersensitivity reactions and/or infection.  The patient verified that they had the office and the Emergency Room's contact information if any problems should arise.  After several minutes, the patient informed me that they felt fine and was released from the office.